# Patient Record
Sex: FEMALE | Race: BLACK OR AFRICAN AMERICAN | NOT HISPANIC OR LATINO | Employment: OTHER | URBAN - METROPOLITAN AREA
[De-identification: names, ages, dates, MRNs, and addresses within clinical notes are randomized per-mention and may not be internally consistent; named-entity substitution may affect disease eponyms.]

---

## 2017-01-30 RX ORDER — AZATHIOPRINE 50 MG/1
TABLET ORAL
Qty: 90 TABLET | Refills: 3 | Status: SHIPPED | OUTPATIENT
Start: 2017-01-30 | End: 2017-04-03 | Stop reason: SDUPTHER

## 2017-02-13 RX ORDER — RABEPRAZOLE SODIUM 20 MG/1
20 TABLET, DELAYED RELEASE ORAL DAILY
Qty: 90 TABLET | Refills: 0 | Status: SHIPPED | OUTPATIENT
Start: 2017-02-13 | End: 2017-05-11 | Stop reason: SDUPTHER

## 2017-04-04 RX ORDER — AZATHIOPRINE 50 MG/1
TABLET ORAL
Qty: 90 TABLET | Refills: 1 | Status: SHIPPED | OUTPATIENT
Start: 2017-04-04 | End: 2017-08-03 | Stop reason: SDUPTHER

## 2017-05-12 RX ORDER — RABEPRAZOLE SODIUM 20 MG/1
20 TABLET, DELAYED RELEASE ORAL DAILY
Qty: 90 TABLET | Refills: 0 | Status: SHIPPED | OUTPATIENT
Start: 2017-05-12 | End: 2017-08-03 | Stop reason: SDUPTHER

## 2017-07-24 RX ORDER — NALOXEGOL OXALATE 25 MG/1
TABLET, FILM COATED ORAL
Qty: 30 TABLET | Refills: 2 | OUTPATIENT
Start: 2017-07-24

## 2017-07-24 RX ORDER — BUDESONIDE 9 MG/1
TABLET, EXTENDED RELEASE ORAL
Qty: 30 TABLET | Refills: 11 | OUTPATIENT
Start: 2017-07-24

## 2017-07-28 RX ORDER — NALOXEGOL OXALATE 25 MG/1
TABLET, FILM COATED ORAL
Qty: 30 TABLET | Refills: 0 | Status: SHIPPED | OUTPATIENT
Start: 2017-07-28 | End: 2017-08-24 | Stop reason: SDUPTHER

## 2017-07-28 RX ORDER — BUDESONIDE 9 MG/1
TABLET, EXTENDED RELEASE ORAL
Qty: 30 TABLET | Refills: 0 | Status: SHIPPED | OUTPATIENT
Start: 2017-07-28 | End: 2017-08-28 | Stop reason: SDUPTHER

## 2017-07-28 NOTE — TELEPHONE ENCOUNTER
Attempted to call patient regarding her medication refills, and yearly f/u no answer, no voicemail. Unable to leave message.

## 2017-08-03 ENCOUNTER — TELEPHONE (OUTPATIENT)
Dept: GASTROENTEROLOGY | Facility: CLINIC | Age: 47
End: 2017-08-03

## 2017-08-03 RX ORDER — AZATHIOPRINE 50 MG/1
150 TABLET ORAL DAILY
Qty: 90 TABLET | Refills: 0 | Status: SHIPPED | OUTPATIENT
Start: 2017-08-03 | End: 2018-09-27 | Stop reason: SDUPTHER

## 2017-08-03 RX ORDER — RABEPRAZOLE SODIUM 20 MG/1
20 TABLET, DELAYED RELEASE ORAL DAILY
Qty: 30 TABLET | Refills: 0 | Status: SHIPPED | OUTPATIENT
Start: 2017-08-03 | End: 2017-08-24 | Stop reason: SDUPTHER

## 2017-08-03 NOTE — TELEPHONE ENCOUNTER
----- Message from Nanci Fischer sent at 8/3/2017  4:04 PM EDT -----  Regarding: PT CALLED - [facility]  Contact: [phone]  ASK IF SHE COULD GET REFILL ON HER MEDS. SEE PENDING REQUEST.

## 2017-08-03 NOTE — TELEPHONE ENCOUNTER
Patient called back she states her RABEprazole and Imuran need to be refilled.  Advised will fill for one month, she must keep her appt with Dr. Hays to continue receiving refills.

## 2017-08-16 RX ORDER — RABEPRAZOLE SODIUM 20 MG/1
TABLET, DELAYED RELEASE ORAL
Qty: 90 TABLET | Refills: 0 | Status: SHIPPED | OUTPATIENT
Start: 2017-08-16 | End: 2017-08-24 | Stop reason: SDUPTHER

## 2017-08-24 ENCOUNTER — OFFICE VISIT (OUTPATIENT)
Dept: GASTROENTEROLOGY | Facility: CLINIC | Age: 47
End: 2017-08-24

## 2017-08-24 VITALS
SYSTOLIC BLOOD PRESSURE: 142 MMHG | DIASTOLIC BLOOD PRESSURE: 82 MMHG | BODY MASS INDEX: 38.99 KG/M2 | HEIGHT: 64 IN | WEIGHT: 228.4 LBS | TEMPERATURE: 98.6 F

## 2017-08-24 DIAGNOSIS — K59.03 CONSTIPATION DUE TO OPIOID THERAPY: Primary | ICD-10-CM

## 2017-08-24 DIAGNOSIS — T40.2X5A CONSTIPATION DUE TO OPIOID THERAPY: Primary | ICD-10-CM

## 2017-08-24 DIAGNOSIS — K51.30 ULCERATIVE RECTOSIGMOIDITIS WITHOUT COMPLICATION (HCC): ICD-10-CM

## 2017-08-24 PROCEDURE — 99213 OFFICE O/P EST LOW 20 MIN: CPT | Performed by: INTERNAL MEDICINE

## 2017-08-24 RX ORDER — FERROUS SULFATE 325(65) MG
TABLET ORAL
COMMUNITY
Start: 2017-08-10

## 2017-08-24 RX ORDER — RABEPRAZOLE SODIUM 20 MG/1
20 TABLET, DELAYED RELEASE ORAL DAILY
Qty: 30 TABLET | Refills: 11 | Status: SHIPPED | OUTPATIENT
Start: 2017-08-24 | End: 2018-07-17 | Stop reason: SDUPTHER

## 2017-08-24 RX ORDER — AZATHIOPRINE 50 MG/1
150 TABLET ORAL DAILY
Qty: 90 TABLET | Refills: 11 | Status: SHIPPED | OUTPATIENT
Start: 2017-08-24 | End: 2018-09-27 | Stop reason: SDUPTHER

## 2017-08-24 NOTE — PROGRESS NOTES
Chief Complaint   Patient presents with   • Follow-up     medication refill        Joe Hernandez is a  47 y.o. female here for a follow up visit for Medicine refill in patient with ulcerative proctosigmoiditis GERD and narcotic-induced constipation.    HPI    Patient 39-year-old female with history of ulcerative proctosigmoiditis and GERD status post back injury requiring chronic narcotic use.  Because of this patient with severe constipation improved only with Movantik.  Patient doing well on her current regimen dose.  Patient denies nausea vomiting no bright red blood per rectum or melena.  Patient here for refills.    Past Medical History:   Diagnosis Date   • History of CT scan of abdomen 08/04/2013    fatty liver, post gastric stapling procedure, wal thickening of colon, colitis   • Osteoarthritis    • Spinal cord injury, cervical region    • Ulcerative colitis          Current Outpatient Prescriptions:   •  amitriptyline (ELAVIL) 25 MG tablet, TAKE 1 TABLET BY MOUTH NIGHTLY, Disp: , Rfl: 3  •  azaTHIOprine (IMURAN) 50 MG tablet, Take 3 tablets by mouth Daily., Disp: 90 tablet, Rfl: 0  •  azaTHIOprine (IMURAN) 50 MG tablet, Take 3 tablets by mouth Daily., Disp: 90 tablet, Rfl: 11  •  Biotin 10 MG tablet, Take  by mouth., Disp: , Rfl:   •  cyclobenzaprine (FLEXERIL) 10 MG tablet, Take  by mouth., Disp: , Rfl:   •  docusate sodium (COLACE) 100 MG capsule, TAKE 1 CAPSULE BY MOUTH 2 (TWO) TIMES DAILY, Disp: , Rfl: 0  •  ferrous sulfate 325 (65 FE) MG tablet, TAKE 1 TABLET EVERY DAY, Disp: , Rfl:   •  furosemide (LASIX) 20 MG tablet, TAKE 1 TABLET BY MOUTH 2 (TWO) TIMES DAILY, Disp: , Rfl: 3  •  gabapentin (NEURONTIN) 800 MG tablet, Take  by mouth., Disp: , Rfl:   •  HYDROcodone-acetaminophen (NORCO)  MG per tablet, TAKE 1 TABLET BY MOUTH 4 TIMES A DAY AS NEEDED FOR PAIN, Disp: , Rfl: 0  •  loratadine (CLARITIN) 10 MG tablet, TAKE 1 TABLET BY MOUTH DAILY AS NEEDED FOR ALLERGIES, Disp: , Rfl: 3  •   Multiple Vitamins-Minerals (MULTI COMPLETE PO), Take  by mouth., Disp: , Rfl:   •  Naloxegol Oxalate (MOVANTIK) 25 MG tablet, Take 25 mg by mouth Daily., Disp: 30 tablet, Rfl: 11  •  OXYCONTIN 40 MG 12 hr tablet, TAKE 1 TABLET BY MOUTH EVERY 12 HOURS AS NEEDED FOR PAIN, Disp: , Rfl: 0  •  Probiotic capsule, Take  by mouth., Disp: , Rfl:   •  RABEprazole (ACIPHEX) 20 MG EC tablet, Take 1 tablet by mouth Daily., Disp: 30 tablet, Rfl: 11  •  spironolactone (ALDACTONE) 50 MG tablet, TAKE 1 TABLET BY MOUTH DAILY, Disp: , Rfl: 3  •  UCERIS 9 MG tablet sustained-release 24 hour, TAKE 1 TABLET EVERY DAY, Disp: 30 tablet, Rfl: 0  •  vitamin D (ERGOCALCIFEROL) 62624 UNITS capsule capsule, TAKE 1 CAPSULE BY MOUTH ONCE A WEEK FOR 6 DOSES, Disp: , Rfl: 0    Allergies   Allergen Reactions   • Penicillins        Social History     Social History   • Marital status: Single     Spouse name: N/A   • Number of children: N/A   • Years of education: N/A     Occupational History   • Not on file.     Social History Main Topics   • Smoking status: Never Smoker   • Smokeless tobacco: Never Used   • Alcohol use No   • Drug use: No   • Sexual activity: Not on file     Other Topics Concern   • Not on file     Social History Narrative       History reviewed. No pertinent family history.    Review of Systems   Constitutional: Negative.    Respiratory: Negative.    Cardiovascular: Negative.    Gastrointestinal: Positive for constipation. Negative for abdominal distention, abdominal pain, nausea and vomiting.   Musculoskeletal: Positive for back pain and gait problem.   Skin: Negative.    Hematological: Negative.        Vitals:    08/24/17 1542   BP: 142/82   Temp: 98.6 °F (37 °C)       Physical Exam   Constitutional: She is oriented to person, place, and time. She appears well-developed and well-nourished.   HENT:   Head: Normocephalic and atraumatic.   Eyes: Conjunctivae are normal. No scleral icterus.   Cardiovascular: Exam reveals no gallop  and no friction rub.    No murmur heard.  Pulmonary/Chest: She has no wheezes. She has no rales.   Abdominal: Soft. Bowel sounds are normal. She exhibits no distension and no mass. There is no tenderness. No hernia.   Neurological: She is alert and oriented to person, place, and time.   Skin: Skin is warm and dry.   Psychiatric: She has a normal mood and affect. Her behavior is normal. Thought content normal.   Vitals reviewed.      No visits with results within 2 Month(s) from this visit.  Latest known visit with results is:    Hospital Outpatient Visit on 10/01/2014   Component Date Value Ref Range Status   • WBC 09/30/2014 7.00  4.50 - 10.70 K/Cumm Final   • RBC 09/30/2014 3.73* 3.90 - 5.20 Million Final   • Hemoglobin 09/30/2014 10.3* 11.9 - 15.5 g/dL Final   • Hematocrit 09/30/2014 32.8* 35.6 - 45.5 % Final   • MCV 09/30/2014 87.9  80.5 - 98.2 fL Final   • MCH 09/30/2014 27.6  26.9 - 32.0 pg Final   • MCHC 09/30/2014 31.4* 32.4 - 36.3 g/dL Final   • RDW 09/30/2014 13.4* 11.7 - 13.0 % Final   • Platelets 09/30/2014 420  140 - 500 K/Cumm Final   • Glucose 09/30/2014 87  65 - 99 mg/dL Final   • BUN 09/30/2014 11  6 - 20 mg/dL Final   • Creatinine 09/30/2014 0.53* 0.57 - 1.00 mg/dL Final   • Sodium 09/30/2014 140  136 - 145 mmol/L Final   • Potassium 09/30/2014 4.2  3.5 - 5.2 mmol/L Final   • Chloride 09/30/2014 104  98 - 107 mmol/L Final   • CO2 09/30/2014 26  22 - 29 mmol/L Final   • Calcium 09/30/2014 8.8  8.6 - 10.5 mg/dL Final   • eGFR 09/30/2014 >60  ml/min/1.732 Final   • HCG Qualitative 09/30/2014 Negative   Final       Joe was seen today for follow-up.    Diagnoses and all orders for this visit:    Constipation due to opioid therapy    Ulcerative rectosigmoiditis without complication    Other orders  -     azaTHIOprine (IMURAN) 50 MG tablet; Take 3 tablets by mouth Daily.  -     Naloxegol Oxalate (MOVANTIK) 25 MG tablet; Take 25 mg by mouth Daily.  -     RABEprazole (ACIPHEX) 20 MG EC tablet; Take 1  tablet by mouth Daily.      Patient 39-year-old female with history of GERD ulcerative proctosigmoiditis and narcotic-induced constipation doing well on current regimen.  Patient taking azathioprine and Uceris for her ulcerative proctosigmoiditis AcipHex for her GERD and Movantik for the constipation all doing a good job.  We'll continue current medication and plan follow-up in 1 year for further recommendations.

## 2017-08-28 RX ORDER — BUDESONIDE 9 MG/1
TABLET, EXTENDED RELEASE ORAL
Qty: 90 TABLET | Refills: 3 | Status: SHIPPED | OUTPATIENT
Start: 2017-08-28 | End: 2018-07-17 | Stop reason: SDUPTHER

## 2017-09-07 RX ORDER — DOCUSATE SODIUM 100 MG/1
CAPSULE, LIQUID FILLED ORAL
Qty: 60 CAPSULE | Refills: 10 | Status: SHIPPED | OUTPATIENT
Start: 2017-09-07 | End: 2018-11-02 | Stop reason: SDUPTHER

## 2017-10-03 RX ORDER — AZATHIOPRINE 50 MG/1
150 TABLET ORAL DAILY
Qty: 270 TABLET | Refills: 2 | Status: SHIPPED | OUTPATIENT
Start: 2017-10-03 | End: 2018-06-24 | Stop reason: SDUPTHER

## 2017-10-03 NOTE — TELEPHONE ENCOUNTER
Request received from [facility] pharmacy for a 90 day supply on pt's azathioprine. Medication e-scribed.

## 2017-10-13 RX ORDER — NALOXEGOL OXALATE 25 MG/1
TABLET, FILM COATED ORAL
Qty: 30 TABLET | Refills: 8 | Status: SHIPPED | OUTPATIENT
Start: 2017-10-13 | End: 2018-09-27 | Stop reason: SDUPTHER

## 2018-07-02 RX ORDER — AZATHIOPRINE 50 MG/1
150 TABLET ORAL DAILY
Qty: 90 TABLET | Refills: 0 | Status: SHIPPED | OUTPATIENT
Start: 2018-07-02 | End: 2018-09-27 | Stop reason: SDUPTHER

## 2018-07-25 RX ORDER — RABEPRAZOLE SODIUM 20 MG/1
20 TABLET, DELAYED RELEASE ORAL DAILY
Qty: 30 TABLET | Refills: 0 | Status: SHIPPED | OUTPATIENT
Start: 2018-07-25 | End: 2018-08-27 | Stop reason: SDUPTHER

## 2018-07-30 RX ORDER — AZATHIOPRINE 50 MG/1
150 TABLET ORAL DAILY
Qty: 90 TABLET | Refills: 0 | OUTPATIENT
Start: 2018-07-30

## 2018-08-03 RX ORDER — AZATHIOPRINE 50 MG/1
150 TABLET ORAL DAILY
Qty: 90 TABLET | Refills: 0 | OUTPATIENT
Start: 2018-08-03

## 2018-08-15 RX ORDER — AZATHIOPRINE 50 MG/1
150 TABLET ORAL DAILY
Qty: 90 TABLET | Refills: 0 | OUTPATIENT
Start: 2018-08-15

## 2018-08-17 RX ORDER — BUDESONIDE 9 MG/1
TABLET, EXTENDED RELEASE ORAL
Qty: 90 TABLET | Refills: 3 | OUTPATIENT
Start: 2018-08-17

## 2018-08-17 RX ORDER — AZATHIOPRINE 50 MG/1
150 TABLET ORAL DAILY
Qty: 270 TABLET | Refills: 2 | OUTPATIENT
Start: 2018-08-17

## 2018-08-20 RX ORDER — AZATHIOPRINE 50 MG/1
150 TABLET ORAL DAILY
Qty: 90 TABLET | Refills: 0 | OUTPATIENT
Start: 2018-08-20

## 2018-08-21 ENCOUNTER — TELEPHONE (OUTPATIENT)
Dept: GASTROENTEROLOGY | Facility: CLINIC | Age: 48
End: 2018-08-21

## 2018-08-21 RX ORDER — AZATHIOPRINE 50 MG/1
150 TABLET ORAL DAILY
Qty: 90 TABLET | Refills: 0 | Status: SHIPPED | OUTPATIENT
Start: 2018-08-21 | End: 2018-09-27 | Stop reason: SDUPTHER

## 2018-08-21 RX ORDER — BUDESONIDE 9 MG/1
9 TABLET, FILM COATED, EXTENDED RELEASE ORAL DAILY
Qty: 30 TABLET | Refills: 0 | Status: SHIPPED | OUTPATIENT
Start: 2018-08-21 | End: 2018-09-27 | Stop reason: SDUPTHER

## 2018-08-21 NOTE — TELEPHONE ENCOUNTER
----- Message from Jaylen Alcocer sent at 8/21/2018  3:01 PM EDT -----  Regarding: PENDING MED   Contact: [phone]  PT CALLED WAITING ON MED

## 2018-08-23 RX ORDER — AZATHIOPRINE 50 MG/1
150 TABLET ORAL DAILY
Qty: 90 TABLET | Refills: 0 | OUTPATIENT
Start: 2018-08-23

## 2018-08-28 RX ORDER — RABEPRAZOLE SODIUM 20 MG/1
20 TABLET, DELAYED RELEASE ORAL DAILY
Qty: 30 TABLET | Refills: 0 | Status: SHIPPED | OUTPATIENT
Start: 2018-08-28 | End: 2018-08-31 | Stop reason: SDUPTHER

## 2018-08-31 ENCOUNTER — TELEPHONE (OUTPATIENT)
Dept: GASTROENTEROLOGY | Facility: CLINIC | Age: 48
End: 2018-08-31

## 2018-08-31 RX ORDER — RABEPRAZOLE SODIUM 20 MG/1
20 TABLET, DELAYED RELEASE ORAL DAILY
Qty: 30 TABLET | Refills: 0 | Status: SHIPPED | OUTPATIENT
Start: 2018-08-31 | End: 2018-09-27 | Stop reason: SDUPTHER

## 2018-09-21 RX ORDER — RABEPRAZOLE SODIUM 20 MG/1
TABLET, DELAYED RELEASE ORAL
Qty: 30 TABLET | Refills: 0 | OUTPATIENT
Start: 2018-09-21

## 2018-09-21 RX ORDER — BUDESONIDE 9 MG/1
TABLET, FILM COATED, EXTENDED RELEASE ORAL
Qty: 30 TABLET | Refills: 0 | OUTPATIENT
Start: 2018-09-21

## 2018-09-27 ENCOUNTER — OFFICE VISIT (OUTPATIENT)
Dept: GASTROENTEROLOGY | Facility: CLINIC | Age: 48
End: 2018-09-27

## 2018-09-27 VITALS
DIASTOLIC BLOOD PRESSURE: 82 MMHG | TEMPERATURE: 97.9 F | BODY MASS INDEX: 39.09 KG/M2 | SYSTOLIC BLOOD PRESSURE: 122 MMHG | WEIGHT: 229 LBS | HEIGHT: 64 IN

## 2018-09-27 DIAGNOSIS — K51.30 ULCERATIVE RECTOSIGMOIDITIS WITHOUT COMPLICATION (HCC): ICD-10-CM

## 2018-09-27 DIAGNOSIS — K59.03 CONSTIPATION DUE TO OPIOID THERAPY: Primary | ICD-10-CM

## 2018-09-27 DIAGNOSIS — T40.2X5A CONSTIPATION DUE TO OPIOID THERAPY: Primary | ICD-10-CM

## 2018-09-27 PROCEDURE — 99213 OFFICE O/P EST LOW 20 MIN: CPT | Performed by: INTERNAL MEDICINE

## 2018-09-27 RX ORDER — AZATHIOPRINE 50 MG/1
150 TABLET ORAL DAILY
Qty: 90 TABLET | Refills: 11 | Status: SHIPPED | OUTPATIENT
Start: 2018-09-27 | End: 2019-10-15

## 2018-09-27 NOTE — PROGRESS NOTES
Chief Complaint   Patient presents with   • Annual Exam     Constipation       Joe Hernandez is a  48 y.o. female here for a follow up visit for ulcerative proctosigmoiditis and chronic constipation due to narcotics.    HPI    Patient 48-year-old female with history of ulcerative proctosigmoiditis, osteoarthritis, cervical spine injury and chronic constipation here for follow-up.  Patient reports still constipated though improved having bowel movement every 1-2 weeks.  Patient reports symptomatically much better than before she was taking Movantik.  Since his more regular but still only every other week or so.  Patient also on Colace for this as well.  Patient denies fever chills no chest pain or palpitations.    Past Medical History:   Diagnosis Date   • History of CT scan of abdomen 08/04/2013    fatty liver, post gastric stapling procedure, wal thickening of colon, colitis   • Osteoarthritis    • Spinal cord injury, cervical region (CMS/HCC)    • Ulcerative colitis (CMS/HCC)          Current Outpatient Prescriptions:   •  azaTHIOprine (IMURAN) 50 MG tablet, Take 3 tablets by mouth Daily., Disp: 90 tablet, Rfl: 11  •  Biotin 10 MG tablet, Take  by mouth., Disp: , Rfl:   •  Budesonide ER (UCERIS) 9 MG tablet sustained-release 24 hour, Take 9 mg by mouth Daily., Disp: 30 tablet, Rfl: 0  •  cyclobenzaprine (FLEXERIL) 10 MG tablet, Take  by mouth., Disp: , Rfl:   •  docusate sodium (COLACE) 100 MG capsule, TAKE 1 CAPSULE BY MOUTH 2 (TWO) TIMES DAILY, Disp: 60 capsule, Rfl: 10  •  ferrous sulfate 325 (65 FE) MG tablet, TAKE 1 TABLET EVERY DAY, Disp: , Rfl:   •  gabapentin (NEURONTIN) 800 MG tablet, Take  by mouth., Disp: , Rfl:   •  loratadine (CLARITIN) 10 MG tablet, TAKE 1 TABLET BY MOUTH DAILY AS NEEDED FOR ALLERGIES, Disp: , Rfl: 3  •  Multiple Vitamins-Minerals (MULTI COMPLETE PO), Take  by mouth., Disp: , Rfl:   •  Naloxegol Oxalate (MOVANTIK) 25 MG tablet, Take 1 tablet by mouth Daily., Disp: 30 tablet,  Rfl: 11  •  OXYCONTIN 40 MG 12 hr tablet, TAKE 1 TABLET BY MOUTH EVERY 12 HOURS AS NEEDED FOR PAIN, Disp: , Rfl: 0  •  Probiotic capsule, Take  by mouth., Disp: , Rfl:   •  RABEprazole (ACIPHEX) 20 MG EC tablet, Take 1 tablet by mouth Daily., Disp: 30 tablet, Rfl: 0  •  linaclotide (LINZESS) 290 MCG capsule capsule, Take 1 capsule by mouth Every Morning Before Breakfast., Disp: 90 capsule, Rfl: 3    Allergies   Allergen Reactions   • Red Dye Rash and Swelling   • Adhesive Tape Rash   • Penicillins Rash       Social History     Social History   • Marital status: Single     Spouse name: N/A   • Number of children: N/A   • Years of education: N/A     Occupational History   • Not on file.     Social History Main Topics   • Smoking status: Never Smoker   • Smokeless tobacco: Never Used   • Alcohol use No   • Drug use: No   • Sexual activity: Not on file     Other Topics Concern   • Not on file     Social History Narrative   • No narrative on file       Family History   Problem Relation Age of Onset   • Family history unknown: Yes       Review of Systems   Constitutional: Negative.    Respiratory: Negative.    Cardiovascular: Negative.    Gastrointestinal: Negative.    Musculoskeletal: Positive for arthralgias, back pain, gait problem, neck pain and neck stiffness. Negative for joint swelling and myalgias.   Skin: Negative.    Hematological: Negative.        Vitals:    09/27/18 1356   BP: 122/82   Temp: 97.9 °F (36.6 °C)       Physical Exam   Constitutional: She is oriented to person, place, and time. She appears well-developed and well-nourished.   HENT:   Head: Normocephalic and atraumatic.   Eyes: Pupils are equal, round, and reactive to light. No scleral icterus.   Cardiovascular: Normal rate.  Exam reveals no gallop and no friction rub.    No murmur heard.  Pulmonary/Chest: Effort normal. She has no wheezes. She has no rales.   Abdominal: Soft. Bowel sounds are normal. She exhibits no distension and no mass. There is  no tenderness. No hernia.   Neurological: She is alert and oriented to person, place, and time.   Skin: Skin is warm and dry.   Psychiatric: She has a normal mood and affect. Her behavior is normal. Judgment and thought content normal.   Vitals reviewed.      No visits with results within 2 Month(s) from this visit.   Latest known visit with results is:   Hospital Outpatient Visit on 10/01/2014   Component Date Value Ref Range Status   • WBC 09/30/2014 7.00  4.50 - 10.70 K/Cumm Final   • RBC 09/30/2014 3.73* 3.90 - 5.20 Million Final   • Hemoglobin 09/30/2014 10.3* 11.9 - 15.5 g/dL Final   • Hematocrit 09/30/2014 32.8* 35.6 - 45.5 % Final   • MCV 09/30/2014 87.9  80.5 - 98.2 fL Final   • MCH 09/30/2014 27.6  26.9 - 32.0 pg Final   • MCHC 09/30/2014 31.4* 32.4 - 36.3 g/dL Final   • RDW 09/30/2014 13.4* 11.7 - 13.0 % Final   • Platelets 09/30/2014 420  140 - 500 K/Cumm Final   • Glucose 09/30/2014 87  65 - 99 mg/dL Final   • BUN 09/30/2014 11  6 - 20 mg/dL Final   • Creatinine 09/30/2014 0.53* 0.57 - 1.00 mg/dL Final   • Sodium 09/30/2014 140  136 - 145 mmol/L Final   • Potassium 09/30/2014 4.2  3.5 - 5.2 mmol/L Final   • Chloride 09/30/2014 104  98 - 107 mmol/L Final   • CO2 09/30/2014 26  22 - 29 mmol/L Final   • Calcium 09/30/2014 8.8  8.6 - 10.5 mg/dL Final   • eGFR 09/30/2014 >60  ml/min/1.732 Final   • HCG Qualitative 09/30/2014 Negative   Final       Joe was seen today for annual exam.    Diagnoses and all orders for this visit:    Constipation due to opioid therapy    Ulcerative rectosigmoiditis without complication (CMS/HCC)    Other orders  -     linaclotide (LINZESS) 290 MCG capsule capsule; Take 1 capsule by mouth Every Morning Before Breakfast.  -     azaTHIOprine (IMURAN) 50 MG tablet; Take 3 tablets by mouth Daily.  -     Naloxegol Oxalate (MOVANTIK) 25 MG tablet; Take 1 tablet by mouth Daily.      Patient 48-year-old female with history of proctosigmoiditis related to ulcerative colitis as well as  chronic constipation due to narcotic use.  Patient bowels with no blood per rectum or melena but is having ongoing constipation with only one bowel movement every one to 2 weeks.  Patient definitely better on Movantik but not complete relief.  Patient is also on daily Colace.  This point will add Linzess 290 µg daily and follow clinical response.  Can adjust dosing depending on response.

## 2018-10-03 RX ORDER — RABEPRAZOLE SODIUM 20 MG/1
TABLET, DELAYED RELEASE ORAL
Qty: 90 TABLET | Refills: 2 | Status: SHIPPED | OUTPATIENT
Start: 2018-10-03 | End: 2019-07-02 | Stop reason: SDUPTHER

## 2018-10-04 ENCOUNTER — PRIOR AUTHORIZATION (OUTPATIENT)
Dept: GASTROENTEROLOGY | Facility: CLINIC | Age: 48
End: 2018-10-04

## 2018-10-04 RX ORDER — BUDESONIDE 9 MG/1
TABLET, FILM COATED, EXTENDED RELEASE ORAL
Qty: 90 TABLET | Refills: 2 | Status: SHIPPED | OUTPATIENT
Start: 2018-10-04 | End: 2020-11-10

## 2018-10-10 NOTE — TELEPHONE ENCOUNTER
Letter seems to ignore that pt has ulcerative proctosigmoiditis, reports denial is for use in diagnosis of narcotic induced constipation which she also has, need to correct the diagnosis and resubmit

## 2018-10-11 ENCOUNTER — TELEPHONE (OUTPATIENT)
Dept: GASTROENTEROLOGY | Facility: CLINIC | Age: 48
End: 2018-10-11

## 2018-10-11 NOTE — TELEPHONE ENCOUNTER
----- Message from Jaylen Alcocer sent at 10/11/2018 10:31 AM EDT -----  Regarding: rx  Contact: [phone]  Pending med

## 2018-10-15 NOTE — TELEPHONE ENCOUNTER
"Because there is a denial within the last 60 days for this medication the insurance will not let me resubmit with new DX.    \"There is an EOC that was clinically denied within the last 60 days. This request needs to be sent to the Grievance and Appeals Dept. at [facility]. Appeal requests must come from the member or the provider.\"  "

## 2018-10-16 RX ORDER — RABEPRAZOLE SODIUM 20 MG/1
TABLET, DELAYED RELEASE ORAL
Qty: 30 TABLET | Refills: 0 | OUTPATIENT
Start: 2018-10-16

## 2018-10-23 NOTE — TELEPHONE ENCOUNTER
Letter completed and mailed to:  [employer].   Grievance and Appeals Department   [address] [address]  [place] [address]

## 2018-11-05 RX ORDER — DOCUSATE SODIUM 100 MG/1
CAPSULE, LIQUID FILLED ORAL
Qty: 60 CAPSULE | Refills: 7 | Status: SHIPPED | OUTPATIENT
Start: 2018-11-05 | End: 2019-07-11 | Stop reason: SDUPTHER

## 2018-11-27 RX ORDER — BUDESONIDE 9 MG/1
1 TABLET, FILM COATED, EXTENDED RELEASE ORAL DAILY
Qty: 30 TABLET | Refills: 5 | Status: SHIPPED | OUTPATIENT
Start: 2018-11-27 | End: 2019-06-13 | Stop reason: SDUPTHER

## 2018-12-26 RX ORDER — AZATHIOPRINE 50 MG/1
150 TABLET ORAL DAILY
Qty: 270 TABLET | Refills: 2 | Status: SHIPPED | OUTPATIENT
Start: 2018-12-26 | End: 2019-10-15

## 2019-06-18 RX ORDER — BUDESONIDE 9 MG/1
1 TABLET, FILM COATED, EXTENDED RELEASE ORAL DAILY
Qty: 30 TABLET | Refills: 2 | Status: SHIPPED | OUTPATIENT
Start: 2019-06-18 | End: 2019-10-14 | Stop reason: SDUPTHER

## 2019-07-02 RX ORDER — RABEPRAZOLE SODIUM 20 MG/1
20 TABLET, DELAYED RELEASE ORAL DAILY
Qty: 90 TABLET | Refills: 0 | Status: SHIPPED | OUTPATIENT
Start: 2019-07-02 | End: 2019-10-06 | Stop reason: SDUPTHER

## 2019-07-18 RX ORDER — DOCUSATE SODIUM 100 MG/1
100 CAPSULE, LIQUID FILLED ORAL 2 TIMES DAILY
Qty: 60 CAPSULE | Refills: 0 | Status: SHIPPED | OUTPATIENT
Start: 2019-07-18 | End: 2019-10-15 | Stop reason: SDUPTHER

## 2019-07-18 RX ORDER — AZATHIOPRINE 50 MG/1
150 TABLET ORAL DAILY
Qty: 90 TABLET | Refills: 0 | Status: SHIPPED | OUTPATIENT
Start: 2019-07-18 | End: 2019-10-15 | Stop reason: SDUPTHER

## 2019-09-17 RX ORDER — BUDESONIDE 9 MG/1
1 TABLET, FILM COATED, EXTENDED RELEASE ORAL DAILY
Qty: 30 TABLET | Refills: 2 | OUTPATIENT
Start: 2019-09-17

## 2019-10-08 RX ORDER — RABEPRAZOLE SODIUM 20 MG/1
20 TABLET, DELAYED RELEASE ORAL DAILY
Qty: 30 TABLET | Refills: 0 | Status: SHIPPED | OUTPATIENT
Start: 2019-10-08 | End: 2019-10-15 | Stop reason: SDUPTHER

## 2019-10-15 ENCOUNTER — OFFICE VISIT (OUTPATIENT)
Dept: GASTROENTEROLOGY | Facility: CLINIC | Age: 49
End: 2019-10-15

## 2019-10-15 VITALS — WEIGHT: 241.2 LBS | BODY MASS INDEX: 41.18 KG/M2 | HEIGHT: 64 IN | TEMPERATURE: 99 F

## 2019-10-15 DIAGNOSIS — K59.03 CONSTIPATION DUE TO OPIOID THERAPY: ICD-10-CM

## 2019-10-15 DIAGNOSIS — T40.2X5A CONSTIPATION DUE TO OPIOID THERAPY: ICD-10-CM

## 2019-10-15 DIAGNOSIS — K51.30 ULCERATIVE RECTOSIGMOIDITIS WITHOUT COMPLICATION (HCC): Primary | ICD-10-CM

## 2019-10-15 PROCEDURE — 99212 OFFICE O/P EST SF 10 MIN: CPT | Performed by: INTERNAL MEDICINE

## 2019-10-15 RX ORDER — DOCUSATE SODIUM 100 MG/1
100 CAPSULE, LIQUID FILLED ORAL 2 TIMES DAILY
Qty: 60 CAPSULE | Refills: 11 | Status: SHIPPED | OUTPATIENT
Start: 2019-10-15

## 2019-10-15 RX ORDER — MEDROXYPROGESTERONE ACETATE 10 MG/1
TABLET ORAL
COMMUNITY
Start: 2019-10-14

## 2019-10-15 RX ORDER — AZATHIOPRINE 50 MG/1
150 TABLET ORAL DAILY
Qty: 90 TABLET | Refills: 11 | Status: SHIPPED | OUTPATIENT
Start: 2019-10-15 | End: 2020-11-10 | Stop reason: SDUPTHER

## 2019-10-15 RX ORDER — RABEPRAZOLE SODIUM 20 MG/1
20 TABLET, DELAYED RELEASE ORAL DAILY
Qty: 30 TABLET | Refills: 11 | Status: SHIPPED | OUTPATIENT
Start: 2019-10-15 | End: 2020-10-27

## 2019-10-15 NOTE — PROGRESS NOTES
Chief Complaint   Patient presents with   • Follow-up   • Constipation   • Med Refill       Joe Hernandez is a  49 y.o. female here for a follow up visit for chronic constipation ulcerative proctitis.    HPI     Patient 49-year-old female with history of spinal cord injury with chronic back pain and ulcerative proctosigmoiditis and chronic constipation due to narcotics here for follow-up.  Patient reports due to cost of medication has been missing most of her medicines.  Patient taking azathioprine daily as well as Colace and Movantik.  Patient also taking AcipHex daily.  Patient here for med refill.    Past Medical History:   Diagnosis Date   • History of CT scan of abdomen 08/04/2013    fatty liver, post gastric stapling procedure, wal thickening of colon, colitis   • Osteoarthritis    • Spinal cord injury, cervical region (CMS/HCC)    • Ulcerative colitis (CMS/HCC)          Current Outpatient Medications:   •  azaTHIOprine (IMURAN) 50 MG tablet, Take 3 tablets by mouth Daily., Disp: 90 tablet, Rfl: 11  •  Budesonide ER 9 MG tablet sustained-release 24 hour, TAKE 1 TABLET BY MOUTH EVERY DAY, Disp: 90 tablet, Rfl: 2  •  Budesonide ER 9 MG tablet sustained-release 24 hour, Take 1 tablet by mouth Daily. ** Please make an appointment for annual visit. Thank you., Disp: 30 tablet, Rfl: 2  •  cyclobenzaprine (FLEXERIL) 10 MG tablet, Take  by mouth., Disp: , Rfl:   •  docusate sodium (COLACE) 100 MG capsule, Take 1 capsule by mouth 2 (Two) Times a Day., Disp: 60 capsule, Rfl: 11  •  ferrous sulfate 325 (65 FE) MG tablet, TAKE 1 TABLET EVERY DAY, Disp: , Rfl:   •  gabapentin (NEURONTIN) 800 MG tablet, Take  by mouth., Disp: , Rfl:   •  loratadine (CLARITIN) 10 MG tablet, TAKE 1 TABLET BY MOUTH DAILY AS NEEDED FOR ALLERGIES, Disp: , Rfl: 3  •  medroxyPROGESTERone (PROVERA) 10 MG tablet, , Disp: , Rfl:   •  Multiple Vitamins-Minerals (MULTI COMPLETE PO), Take  by mouth., Disp: , Rfl:   •  Naloxegol Oxalate  (MOVANTIK) 25 MG tablet, Take 1 tablet by mouth Daily., Disp: 30 tablet, Rfl: 11  •  OXYCONTIN 40 MG 12 hr tablet, TAKE 1 TABLET BY MOUTH EVERY 12 HOURS AS NEEDED FOR PAIN, Disp: , Rfl: 0  •  Probiotic capsule, Take  by mouth., Disp: , Rfl:   •  RABEprazole (ACIPHEX) 20 MG EC tablet, Take 1 tablet by mouth Daily., Disp: 30 tablet, Rfl: 11  •  Biotin 10 MG tablet, Take  by mouth., Disp: , Rfl:     Allergies   Allergen Reactions   • Red Dye Rash and Swelling   • Adhesive Tape Rash   • Penicillins Rash       Social History     Socioeconomic History   • Marital status: Single     Spouse name: Not on file   • Number of children: Not on file   • Years of education: Not on file   • Highest education level: Not on file   Tobacco Use   • Smoking status: Never Smoker   • Smokeless tobacco: Never Used   Substance and Sexual Activity   • Alcohol use: No   • Drug use: No       Family History   Family history unknown: Yes       Review of Systems   Constitutional: Negative.    Respiratory: Negative.    Cardiovascular: Negative.    Gastrointestinal: Positive for abdominal pain and constipation. Negative for abdominal distention, anal bleeding, blood in stool, diarrhea, nausea, rectal pain and vomiting.   Musculoskeletal: Negative.    Skin: Negative.    Hematological: Negative.        Vitals:    10/15/19 1413   Temp: 99 °F (37.2 °C)       Physical Exam   Constitutional: She is oriented to person, place, and time. She appears well-developed and well-nourished.   HENT:   Head: Normocephalic and atraumatic.   Cardiovascular: Normal rate, regular rhythm and normal heart sounds.   Pulmonary/Chest: Effort normal and breath sounds normal.   Abdominal: Soft. Bowel sounds are normal. She exhibits no distension and no mass. There is no tenderness. No hernia.   Musculoskeletal: She exhibits edema.   Positive lower extremity edema right greater than left   Neurological: She is alert and oriented to person, place, and time.   Skin: Skin is warm  and dry. No rash noted.   Psychiatric: She has a normal mood and affect. Her behavior is normal.   Vitals reviewed.      No visits with results within 2 Month(s) from this visit.   Latest known visit with results is:   Hospital Outpatient Visit on 10/01/2014   Component Date Value Ref Range Status   • WBC 09/30/2014 7.00  4.50 - 10.70 K/Cumm Final   • RBC 09/30/2014 3.73* 3.90 - 5.20 Million Final   • Hemoglobin 09/30/2014 10.3* 11.9 - 15.5 g/dL Final   • Hematocrit 09/30/2014 32.8* 35.6 - 45.5 % Final   • MCV 09/30/2014 87.9  80.5 - 98.2 fL Final   • MCH 09/30/2014 27.6  26.9 - 32.0 pg Final   • MCHC 09/30/2014 31.4* 32.4 - 36.3 g/dL Final   • RDW 09/30/2014 13.4* 11.7 - 13.0 % Final   • Platelets 09/30/2014 420  140 - 500 K/Cumm Final   • Glucose 09/30/2014 87  65 - 99 mg/dL Final   • BUN 09/30/2014 11  6 - 20 mg/dL Final   • Creatinine 09/30/2014 0.53* 0.57 - 1.00 mg/dL Final   • Sodium 09/30/2014 140  136 - 145 mmol/L Final   • Potassium 09/30/2014 4.2  3.5 - 5.2 mmol/L Final   • Chloride 09/30/2014 104  98 - 107 mmol/L Final   • CO2 09/30/2014 26  22 - 29 mmol/L Final   • Calcium 09/30/2014 8.8  8.6 - 10.5 mg/dL Final   • eGFR 09/30/2014 >60  ml/min/1.732 Final   • HCG Qualitative 09/30/2014 Negative   Final       Joe was seen today for follow-up, constipation and med refill.    Diagnoses and all orders for this visit:    Ulcerative rectosigmoiditis without complication (CMS/HCC)    Constipation due to opioid therapy    Other orders  -     azaTHIOprine (IMURAN) 50 MG tablet; Take 3 tablets by mouth Daily.  -     docusate sodium (COLACE) 100 MG capsule; Take 1 capsule by mouth 2 (Two) Times a Day.  -     RABEprazole (ACIPHEX) 20 MG EC tablet; Take 1 tablet by mouth Daily.  -     Naloxegol Oxalate (MOVANTIK) 25 MG tablet; Take 1 tablet by mouth Daily.      Patient 49-year-old female with history of chronic back pain wheelchair-bound with lymphedema in the lower extremities and ulcerative colitis here for  follow-up.  Patient doing fairly well on current regimen though was unable to afford both her Movantik and her Linzess so has been using the Movantik only.  Patient was the bathroom approximately once weekly.  Patient denies any nausea vomiting no melena noted.  Will continue current therapy and give Linzess samples to aid for the days in between when she needs to go to the bathroom.

## 2019-10-16 RX ORDER — NALOXEGOL OXALATE 25 MG/1
TABLET, FILM COATED ORAL
Qty: 30 TABLET | Refills: 11 | OUTPATIENT
Start: 2019-10-16

## 2019-10-17 RX ORDER — BUDESONIDE 9 MG/1
1 TABLET, FILM COATED, EXTENDED RELEASE ORAL DAILY
Qty: 30 TABLET | Refills: 2 | Status: SHIPPED | OUTPATIENT
Start: 2019-10-17 | End: 2020-11-10

## 2019-11-01 ENCOUNTER — PRIOR AUTHORIZATION (OUTPATIENT)
Dept: GASTROENTEROLOGY | Facility: CLINIC | Age: 49
End: 2019-11-01

## 2019-11-04 RX ORDER — BUDESONIDE 9 MG/1
1 TABLET, FILM COATED, EXTENDED RELEASE ORAL DAILY
Qty: 30 TABLET | Refills: 10 | Status: SHIPPED | OUTPATIENT
Start: 2019-11-04 | End: 2020-11-10

## 2019-11-04 NOTE — TELEPHONE ENCOUNTER
Med e-scribed   
Okay to call in and refill x11  
PA has been approved as a 30 day supply.  Insurance will not approve a 90 day supply for patient.  Letter is in media   
PA submitted through [facility] for Budesonide ER 9MG er tablets.  Pending   
awaiting bed, no change
awaiting transfer, no change

## 2019-12-11 RX ORDER — AZATHIOPRINE 50 MG/1
150 TABLET ORAL DAILY
Qty: 270 TABLET | Refills: 2 | Status: SHIPPED | OUTPATIENT
Start: 2019-12-11 | End: 2020-09-14

## 2020-01-21 ENCOUNTER — TELEPHONE (OUTPATIENT)
Dept: GASTROENTEROLOGY | Facility: CLINIC | Age: 50
End: 2020-01-21

## 2020-01-21 NOTE — TELEPHONE ENCOUNTER
----- Message from Ismael Day sent at 1/20/2020  4:14 PM EST -----  Regarding: meds  Contact: [phone]  Pt is calling wanting to talk to the nurse about her medication and a letter she received.

## 2020-03-03 ENCOUNTER — PRIOR AUTHORIZATION (OUTPATIENT)
Dept: GASTROENTEROLOGY | Facility: CLINIC | Age: 50
End: 2020-03-03

## 2020-09-14 ENCOUNTER — TELEPHONE (OUTPATIENT)
Dept: GASTROENTEROLOGY | Facility: CLINIC | Age: 50
End: 2020-09-14

## 2020-09-14 RX ORDER — AZATHIOPRINE 50 MG/1
TABLET ORAL
Qty: 270 TABLET | Refills: 0 | Status: SHIPPED | OUTPATIENT
Start: 2020-09-14 | End: 2020-11-10 | Stop reason: SDUPTHER

## 2020-09-14 NOTE — TELEPHONE ENCOUNTER
----- Message from Ismael Arora sent at 9/11/2020  3:49 PM EDT -----  Regarding: REFILL REQUEST BY PHARMACY-NO RESPONSE        PT STATED PHARMACY HAS SENT ORDER MEDICINE ORDER 3 TIMES, NO RESPONSE. [phone]    azaTHIOprine (IMURAN) 50 MG tablet 90 tablet 11 10/15/2019    Sig - Route: Take 3 tablets by mouth Daily. - Oral   Sent to pharmacy as: azaTHIOprine 50 MG Oral Tablet   E-Prescribing Status: Receipt confirmed by pharmacy (10/15/2019  2:49 PM EDT)   Pharmacy     [id number] - [place], [place] - [address] [place] [address] RD. AT [facility] - [phone] [facility] [phone] FX

## 2020-10-27 RX ORDER — NALOXEGOL OXALATE 25 MG/1
TABLET, FILM COATED ORAL
Qty: 30 TABLET | Refills: 11 | Status: SHIPPED | OUTPATIENT
Start: 2020-10-27 | End: 2020-11-10 | Stop reason: SDUPTHER

## 2020-10-27 RX ORDER — RABEPRAZOLE SODIUM 20 MG/1
TABLET, DELAYED RELEASE ORAL
Qty: 90 TABLET | Refills: 3 | Status: SHIPPED | OUTPATIENT
Start: 2020-10-27 | End: 2020-11-10 | Stop reason: SDUPTHER

## 2020-11-10 ENCOUNTER — OFFICE VISIT (OUTPATIENT)
Dept: GASTROENTEROLOGY | Facility: CLINIC | Age: 50
End: 2020-11-10

## 2020-11-10 VITALS — TEMPERATURE: 98.8 F | WEIGHT: 249 LBS | BODY MASS INDEX: 42.51 KG/M2 | HEIGHT: 64 IN

## 2020-11-10 DIAGNOSIS — T40.2X5A CONSTIPATION DUE TO OPIOID THERAPY: ICD-10-CM

## 2020-11-10 DIAGNOSIS — K51.30 ULCERATIVE RECTOSIGMOIDITIS WITHOUT COMPLICATION (HCC): Primary | ICD-10-CM

## 2020-11-10 DIAGNOSIS — K59.03 CONSTIPATION DUE TO OPIOID THERAPY: ICD-10-CM

## 2020-11-10 PROCEDURE — 99213 OFFICE O/P EST LOW 20 MIN: CPT | Performed by: INTERNAL MEDICINE

## 2020-11-10 RX ORDER — NALOXEGOL OXALATE 25 MG/1
25 TABLET, FILM COATED ORAL DAILY
Qty: 90 TABLET | Refills: 3 | Status: SHIPPED | OUTPATIENT
Start: 2020-11-10 | End: 2022-11-10 | Stop reason: SDUPTHER

## 2020-11-10 RX ORDER — RABEPRAZOLE SODIUM 20 MG/1
20 TABLET, DELAYED RELEASE ORAL DAILY
Qty: 90 TABLET | Refills: 3 | Status: SHIPPED | OUTPATIENT
Start: 2020-11-10 | End: 2021-11-23

## 2020-11-10 RX ORDER — BACLOFEN 10 MG/1
10 TABLET ORAL 2 TIMES DAILY
COMMUNITY
Start: 2020-11-06

## 2020-11-10 RX ORDER — AZATHIOPRINE 50 MG/1
150 TABLET ORAL DAILY
Qty: 270 TABLET | Refills: 3 | Status: SHIPPED | OUTPATIENT
Start: 2020-11-10 | End: 2021-11-23

## 2020-11-10 NOTE — PROGRESS NOTES
Chief Complaint   Patient presents with   • Follow-up   • Ulcerative Colitis       Joe Hernandez is a  50 y.o. female here for a follow up visit for ulcerative proctosigmoiditis and chronic constipation due to narcotics.    HPI     Patient 50-year-old female history of osteoarthritis, spinal cord injury, ulcerative colitis and drug-induced constipation here for follow-up.  Patient last colonoscopy approximately 10 years ago due for follow-up.  Patient denies any bright red blood per rectum or melena does have occasional crampy abdominal discomfort but of most part stools are regular regular on current therapy.  Patient has noted weight gain but is been more sedentary due to current viral outbreak.  Patient here for med refill.    Past Medical History:   Diagnosis Date   • History of CT scan of abdomen 08/04/2013    fatty liver, post gastric stapling procedure, wal thickening of colon, colitis   • Osteoarthritis    • Spinal cord injury, cervical region (CMS/HCC)    • Ulcerative colitis (CMS/HCC)          Current Outpatient Medications:   •  azaTHIOprine (IMURAN) 50 MG tablet, TAKE 3 TABLETS BY MOUTH EVERY DAY, Disp: 270 tablet, Rfl: 0  •  baclofen (LIORESAL) 10 MG tablet, Take 10 mg by mouth 2 (Two) Times a Day., Disp: , Rfl:   •  Biotin 10 MG tablet, Take  by mouth., Disp: , Rfl:   •  cyclobenzaprine (FLEXERIL) 10 MG tablet, Take  by mouth., Disp: , Rfl:   •  docusate sodium (COLACE) 100 MG capsule, Take 1 capsule by mouth 2 (Two) Times a Day., Disp: 60 capsule, Rfl: 11  •  ferrous sulfate 325 (65 FE) MG tablet, TAKE 1 TABLET EVERY DAY, Disp: , Rfl:   •  gabapentin (NEURONTIN) 800 MG tablet, Take  by mouth., Disp: , Rfl:   •  loratadine (CLARITIN) 10 MG tablet, TAKE 1 TABLET BY MOUTH DAILY AS NEEDED FOR ALLERGIES, Disp: , Rfl: 3  •  medroxyPROGESTERone (PROVERA) 10 MG tablet, , Disp: , Rfl:   •  Movantik 25 MG tablet, TAKE 1 TABLET BY MOUTH EVERY DAY, Disp: 30 tablet, Rfl: 11  •  Multiple Vitamins-Minerals  (MULTI COMPLETE PO), Take  by mouth., Disp: , Rfl:   •  OXYCONTIN 40 MG 12 hr tablet, TAKE 1 TABLET BY MOUTH EVERY 12 HOURS AS NEEDED FOR PAIN, Disp: , Rfl: 0  •  Probiotic capsule, Take  by mouth., Disp: , Rfl:   •  RABEprazole (ACIPHEX) 20 MG EC tablet, TAKE 1 TABLET BY MOUTH EVERY DAY, Disp: 90 tablet, Rfl: 3    Allergies   Allergen Reactions   • Red Dye Rash and Swelling   • Adhesive Tape Rash   • Nuts Itching and Rash   • Penicillins Rash       Social History     Socioeconomic History   • Marital status: Single     Spouse name: Not on file   • Number of children: Not on file   • Years of education: Not on file   • Highest education level: Not on file   Tobacco Use   • Smoking status: Never Smoker   • Smokeless tobacco: Never Used   Substance and Sexual Activity   • Alcohol use: No   • Drug use: No       Family History   Family history unknown: Yes       Review of Systems   Constitutional: Negative.    Respiratory: Negative.    Cardiovascular: Negative.    Gastrointestinal: Negative for abdominal distention, abdominal pain, anal bleeding, blood in stool, constipation, diarrhea, nausea and rectal pain.   Musculoskeletal: Positive for arthralgias, back pain and gait problem.   Skin: Negative.    Hematological: Negative.        Vitals:    11/10/20 1102   Temp: 98.8 °F (37.1 °C)       Physical Exam  Vitals signs reviewed.   Constitutional:       Appearance: She is well-developed.   HENT:      Head: Normocephalic and atraumatic.   Eyes:      General: No scleral icterus.     Pupils: Pupils are equal, round, and reactive to light.   Cardiovascular:      Rate and Rhythm: Normal rate and regular rhythm.      Heart sounds: Normal heart sounds.   Pulmonary:      Effort: Pulmonary effort is normal. No respiratory distress.      Breath sounds: Normal breath sounds.   Abdominal:      General: Bowel sounds are normal. There is no distension.      Palpations: Abdomen is soft. There is no mass.      Tenderness: There is no  abdominal tenderness.      Hernia: No hernia is present.   Musculoskeletal:      Right lower leg: Edema present.      Left lower leg: Edema present.      Comments: Patient in motorized wheelchair   Skin:     General: Skin is warm and dry.   Neurological:      General: No focal deficit present.      Mental Status: She is alert and oriented to person, place, and time.      Cranial Nerves: No cranial nerve deficit.   Psychiatric:         Behavior: Behavior normal.         Thought Content: Thought content normal.         Judgment: Judgment normal.         No visits with results within 2 Month(s) from this visit.   Latest known visit with results is:   No results found for any previous visit.       Diagnoses and all orders for this visit:    1. Ulcerative rectosigmoiditis without complication (CMS/[facility]) (Primary)  -     Case Request; Standing  -     Implement Anesthesia orders day of procedure.; Standing  -     Obtain informed consent; Standing  -     Case Request    2. Constipation due to opioid therapy      Patient 50-year-old female with history of chronic back pain, proctosigmoiditis and osteoarthritis here for follow-up.  Patient doing relatively well on current medication therapy.  Bowels for the most part are regular though has occasional day or 2 of loose stools.  Patient denies any bright red blood per rectum or melena no chest pain or shortness of breath.  We will continue with her current therapy, arrange follow-up colonoscopy early next year try to avoid Covid for now patient is due.  We will follow-up clinically based on response to monitor her therapy.

## 2020-12-28 ENCOUNTER — TELEPHONE (OUTPATIENT)
Dept: GASTROENTEROLOGY | Facility: CLINIC | Age: 50
End: 2020-12-28

## 2021-01-08 ENCOUNTER — TELEPHONE (OUTPATIENT)
Dept: GASTROENTEROLOGY | Facility: CLINIC | Age: 51
End: 2021-01-08

## 2021-01-08 NOTE — TELEPHONE ENCOUNTER
Spoke with pt regarding this medication.  Angela MARINO placed pt on hold called pharmacy. Pharmacy states they did not receive the Escrib order that was confirmed in our system Angela MARINO gave verbal order to pharmacist to fill for pt.  Pt notified

## 2021-01-08 NOTE — TELEPHONE ENCOUNTER
[employer] has approved a 30 day supply for the drug listed above but denied your request for a 90 day supply. The drug you requested is a specialty drug. Your Prescription Drug Guide says that specialty drugs are limited to a 30-day supply. You can view your PDG online at [employer]/SearchResources. A full list of pharmacies is also available at The Pharmacy Finder Tool at [email or web address] or you may call customer [facility] at [phone] (TTY: [phone]), 8 a.m. 8 p.m. EST, seven days a week for a full list of in-network pharmacies. If receiving your drugs via mail-order delivery is more convenient for you, you can visit our list of in-network mail-order pharmacies at [email or web address] (30-day supply limit on some specialty drugs also applies to mail-order). [employer] has approved coverage for ORTIKOS ER 9 MG CAPSULE #30 under your Part D benefit through 12.31.21.

## 2021-02-09 ENCOUNTER — TELEPHONE (OUTPATIENT)
Dept: GASTROENTEROLOGY | Facility: CLINIC | Age: 51
End: 2021-02-09

## 2021-02-09 NOTE — TELEPHONE ENCOUNTER
----- Message from Ismael Benitez sent at 2/5/2021  2:52 PM EST -----  Regarding: Medication pa  Contact: [phone]  Pt needs a prior auth for her medication. Thank you    Budesonide ER 9 MG capsule sustained-release 24 hr 30 capsule 11 1/21/2021    Sig - Route: Take 9 mg by mouth Daily. - Oral   Sent to pharmacy as: Budesonide ER 9 MG Oral Capsule Extended Release 24 Hour   E-Prescribing Status: Receipt confirmed by pharmacy (1/21/2021  9:58 AM EST)   Pharmacy    [id number] - [address] AT [facility] [phone] [facility] [phone]

## 2021-02-10 ENCOUNTER — TELEPHONE (OUTPATIENT)
Dept: GASTROENTEROLOGY | Facility: CLINIC | Age: 51
End: 2021-02-10

## 2021-02-11 NOTE — TELEPHONE ENCOUNTER
Bharti Franco [facility] [address]  Phone Number: [phone]    Patient is asking for her medication that needs a PA - budesonide .

## 2021-02-12 NOTE — TELEPHONE ENCOUNTER
Key: [id number]    Deniedon February 10  [employer] has approved a 30 day supply for the drug listed above but denied your request for a 90 day supply. The drug you requested is a specialty drug. Your Prescription Drug Guide says that specialty drugs are limited to a 30-day supply. You can view your PDG online at [employer]/SearchResources. A full list of pharmacies is also available at The Pharmacy Finder Tool at [email or web address]/pharmacy/ or you may call customer [facility] at [phone] (TTY: [phone]), 8 a.m. 8 p.m. EST, seven days a week for a full list of in-network pharmacies. If receiving your drugs via mail-order delivery is more convenient for you, you can visit our list of in-network mail-order pharmacies at [email or web address].[employer]/mail-order (30-day supply limit on some specialty drugs also applies to mail-order). [employer] has approved coverage for BUDESONIDE ER 9 MG TABLET (up to a 30 day supply) under your Part D benefit for/through 8 weeks.

## 2021-02-12 NOTE — TELEPHONE ENCOUNTER
Called pt and advised PA for budesonide is still in process. Advised pt to call customer service number per Crystal's note on 1/8/21 to see if there is a mail order pharmacy we need to see rx to.  Pt aggressively blamed office for not getting medication covered. Repeatedly explained to pt she needs to call her ins company to see where we need to send rx for mail order.

## 2021-04-02 ENCOUNTER — TELEPHONE (OUTPATIENT)
Dept: GASTROENTEROLOGY | Facility: CLINIC | Age: 51
End: 2021-04-02

## 2021-04-02 NOTE — TELEPHONE ENCOUNTER
----- Message from Ismael Waddell sent at 4/2/2021  9:53 AM EDT -----  Regarding: Colitis flare up  Contact: [phone]  PT states she is having a colitis flare up and would like to discuss having a prescription called in to help, please advise

## 2021-04-02 NOTE — TELEPHONE ENCOUNTER
She needs not to take Imodium for her cramps as she already tends to constipation. Okay to give her some Levsin sublingual 0.125 mg every 4 for cramps and arrange office visit next week

## 2021-04-02 NOTE — TELEPHONE ENCOUNTER
Returned patient's phone call. She states she has had severe abdominal cramping and pain. She states on Wednesday she was passing pasty diarrhea several times a day. She states she has not had a BM since early yesterday. She states she is having the urge to pass a BM but is unable.   She states she does note have any blood in stool.   She states she took Imodium and Mylanta yesterday to help with the pain and it did not help. States the only change she has had with her medications is her probiotic. She states the one she was taking has been discontinued and she has not found one that works.   Advised an update will be sent to Dr. Hays for advisement.   Pt verbalized understanding.

## 2021-04-08 ENCOUNTER — TELEPHONE (OUTPATIENT)
Dept: GASTROENTEROLOGY | Facility: CLINIC | Age: 51
End: 2021-04-08

## 2021-04-08 ENCOUNTER — OFFICE VISIT (OUTPATIENT)
Dept: GASTROENTEROLOGY | Facility: CLINIC | Age: 51
End: 2021-04-08

## 2021-04-08 VITALS — HEIGHT: 64 IN | BODY MASS INDEX: 42.74 KG/M2 | TEMPERATURE: 97 F

## 2021-04-08 DIAGNOSIS — K51.30 ULCERATIVE RECTOSIGMOIDITIS WITHOUT COMPLICATION (HCC): Primary | ICD-10-CM

## 2021-04-08 PROCEDURE — 99214 OFFICE O/P EST MOD 30 MIN: CPT | Performed by: INTERNAL MEDICINE

## 2021-04-08 RX ORDER — DULOXETIN HYDROCHLORIDE 30 MG/1
CAPSULE, DELAYED RELEASE ORAL
COMMUNITY
Start: 2021-04-05

## 2021-04-08 RX ORDER — MESALAMINE 1.2 G/1
4800 TABLET, DELAYED RELEASE ORAL
Qty: 120 TABLET | Refills: 5 | Status: SHIPPED | OUTPATIENT
Start: 2021-04-08 | End: 2021-09-14

## 2021-04-08 RX ORDER — GABAPENTIN 600 MG/1
TABLET ORAL 3 TIMES DAILY
COMMUNITY
Start: 2021-04-07

## 2021-04-08 NOTE — TELEPHONE ENCOUNTER
Called to [email or web address] department [phone] and spoke with Lis. She states budesonide is a specialty drug and will be covered every 30 days. It cannot be filled as any other quantity. Per her, patient can get a 30 day supply and refill it every month, not a 90 day supply at once. As long as she gets 30 tablets at a time, it will be covered for ever how many refill are requested. Call reference #[id number].

## 2021-04-08 NOTE — PROGRESS NOTES
Chief Complaint   Patient presents with   • Diarrhea   • Abdominal Cramping       Joe Hernandez is a  50 y.o. female here for a follow up visit for ulcerative proctosigmoiditis flare.    HPI     Patient 50-year-old female with history of osteoarthritis, fatty liver, ulcerative proctosigmoiditis here with exacerbation.  Patient reports rectal urgency and inability to come off the toilet with diarrhea.  Patient reports she will start to go to the bathroom particularly in the morning and in have to spend the northern amount of time they are feeling that she still has to go to the bathroom.  Patient does suffer some immobility due to her arthritis issues in her spine.  Patient with significant lymphedema as well.  Patient denies any nausea vomiting no fever chills but is having rectal pain.    Past Medical History:   Diagnosis Date   • History of CT scan of abdomen 08/04/2013    fatty liver, post gastric stapling procedure, wal thickening of colon, colitis   • Osteoarthritis    • Spinal cord injury, cervical region (CMS/HCC)    • Ulcerative colitis (CMS/HCC)          Current Outpatient Medications:   •  azaTHIOprine (IMURAN) 50 MG tablet, Take 3 tablets by mouth Daily., Disp: 270 tablet, Rfl: 3  •  baclofen (LIORESAL) 10 MG tablet, Take 10 mg by mouth 2 (Two) Times a Day., Disp: , Rfl:   •  Biotin 10 MG tablet, Take  by mouth., Disp: , Rfl:   •  Budesonide ER 9 MG capsule sustained-release 24 hr, Take 9 mg by mouth Daily., Disp: 30 capsule, Rfl: 11  •  cyclobenzaprine (FLEXERIL) 10 MG tablet, Take  by mouth., Disp: , Rfl:   •  docusate sodium (COLACE) 100 MG capsule, Take 1 capsule by mouth 2 (Two) Times a Day., Disp: 60 capsule, Rfl: 11  •  DULoxetine (CYMBALTA) 30 MG capsule, , Disp: , Rfl:   •  ferrous sulfate 325 (65 FE) MG tablet, TAKE 1 TABLET EVERY DAY, Disp: , Rfl:   •  gabapentin (NEURONTIN) 600 MG tablet, 3 (Three) Times a Day., Disp: , Rfl:   •  hyoscyamine (LEVSIN) 0.125 MG SL tablet, Take 1 tablet  by mouth Every 4 (Four) Hours As Needed for Cramping., Disp: 90 tablet, Rfl: 5  •  loratadine (CLARITIN) 10 MG tablet, TAKE 1 TABLET BY MOUTH DAILY AS NEEDED FOR ALLERGIES, Disp: , Rfl: 3  •  Multiple Vitamins-Minerals (MULTI COMPLETE PO), Take  by mouth., Disp: , Rfl:   •  Naloxegol Oxalate (Movantik) 25 MG tablet, Take 1 tablet by mouth Daily., Disp: 90 tablet, Rfl: 3  •  OXYCONTIN 40 MG 12 hr tablet, TAKE 1 TABLET BY MOUTH EVERY 12 HOURS AS NEEDED FOR PAIN, Disp: , Rfl: 0  •  Probiotic capsule, Take  by mouth., Disp: , Rfl:   •  RABEprazole (ACIPHEX) 20 MG EC tablet, Take 1 tablet by mouth Daily., Disp: 90 tablet, Rfl: 3  •  gabapentin (NEURONTIN) 800 MG tablet, Take  by mouth., Disp: , Rfl:   •  medroxyPROGESTERone (PROVERA) 10 MG tablet, , Disp: , Rfl:     Allergies   Allergen Reactions   • Red Dye Rash and Swelling   • Adhesive Tape Rash   • Benadryl [Diphenhydramine] Swelling and Rash   • Nuts Itching and Rash   • Penicillins Rash   • Pepto-Bismol [Bismuth] Swelling and Rash       Social History     Socioeconomic History   • Marital status: Single     Spouse name: Not on file   • Number of children: Not on file   • Years of education: Not on file   • Highest education level: Not on file   Tobacco Use   • Smoking status: Never Smoker   • Smokeless tobacco: Never Used   Substance and Sexual Activity   • Alcohol use: No   • Drug use: No       Family History   Family history unknown: Yes       Review of Systems   Constitutional: Negative.    Respiratory: Negative.    Cardiovascular: Negative.    Gastrointestinal: Positive for abdominal pain, diarrhea and rectal pain. Negative for abdominal distention, anal bleeding, blood in stool, constipation and vomiting.   Musculoskeletal: Positive for arthralgias, back pain and gait problem.   Skin: Negative.        Vitals:    04/08/21 1159   Temp: 97 °F (36.1 °C)       Physical Exam  Vitals reviewed.   Constitutional:       Appearance: Normal appearance. She is  well-developed.   HENT:      Head: Normocephalic and atraumatic.   Eyes:      General: No scleral icterus.     Pupils: Pupils are equal, round, and reactive to light.   Cardiovascular:      Rate and Rhythm: Normal rate and regular rhythm.      Heart sounds: Normal heart sounds.   Pulmonary:      Effort: Pulmonary effort is normal. No respiratory distress.      Breath sounds: Normal breath sounds.   Abdominal:      General: Bowel sounds are normal. There is no distension.      Palpations: Abdomen is soft. There is no mass.      Tenderness: There is no abdominal tenderness.      Hernia: No hernia is present.   Musculoskeletal:      Right lower leg: Edema present.      Left lower leg: Edema present.   Skin:     General: Skin is warm and dry.      Coloration: Skin is not jaundiced.      Findings: No rash.   Neurological:      General: No focal deficit present.      Mental Status: She is alert and oriented to person, place, and time.   Psychiatric:         Behavior: Behavior normal.         Thought Content: Thought content normal.         No visits with results within 2 Month(s) from this visit.   Latest known visit with results is:   No results found for any previous visit.       Diagnoses and all orders for this visit:    1. Ulcerative rectosigmoiditis without complication (CMS/[facility]) (Primary)  -     CBC (No Diff)  -     Iron  -     Comprehensive Metabolic Panel  -     Sedimentation Rate  -     C-reactive Protein      Patient 50-year-old female with history of ulcerative proctosigmoiditis, spinal fusion and immobility presenting with exacerbation of her colitis.  Patient reports rectal urgency with rectal pain and diarrhea.  Patient however describes diarrhea as 1 loose bowel movement today but having trouble getting off the toilet when she starts.  Patient has continued sensation of the urge to go despite passing stool.  Patient denies any significant bright red blood per rectum or melena noted.  Patient reports still  taking her usual medications.  Patient had been due for colonoscopy but still pending.  Patient missed appointment.  Will check labs today including inflammatory markers CBC iron levels.  If signs of possible infection or active inflammation will arrange CT.  We will add mesalamine pending results and consider urgent colonoscopy if things stabilize.

## 2021-04-09 LAB
ALBUMIN SERPL-MCNC: 3.8 G/DL (ref 3.5–5.2)
ALBUMIN/GLOB SERPL: 1.2 G/DL
ALP SERPL-CCNC: 87 U/L (ref 39–117)
ALT SERPL-CCNC: 12 U/L (ref 1–33)
AST SERPL-CCNC: 17 U/L (ref 1–32)
BILIRUB SERPL-MCNC: 0.6 MG/DL (ref 0–1.2)
BUN SERPL-MCNC: 11 MG/DL (ref 6–20)
BUN/CREAT SERPL: 20 (ref 7–25)
CALCIUM SERPL-MCNC: 9 MG/DL (ref 8.6–10.5)
CHLORIDE SERPL-SCNC: 105 MMOL/L (ref 98–107)
CO2 SERPL-SCNC: 29.8 MMOL/L (ref 22–29)
CREAT SERPL-MCNC: 0.55 MG/DL (ref 0.57–1)
CRP SERPL-MCNC: <0.3 MG/DL (ref 0–0.5)
ERYTHROCYTE [DISTWIDTH] IN BLOOD BY AUTOMATED COUNT: 13.9 % (ref 12.3–15.4)
ERYTHROCYTE [SEDIMENTATION RATE] IN BLOOD BY WESTERGREN METHOD: 54 MM/HR (ref 0–30)
GLOBULIN SER CALC-MCNC: 3.1 GM/DL
GLUCOSE SERPL-MCNC: 96 MG/DL (ref 65–99)
HCT VFR BLD AUTO: 37.7 % (ref 34–46.6)
HGB BLD-MCNC: 12.6 G/DL (ref 12–15.9)
IRON SERPL-MCNC: 26 MCG/DL (ref 37–145)
MCH RBC QN AUTO: 32.4 PG (ref 26.6–33)
MCHC RBC AUTO-ENTMCNC: 33.4 G/DL (ref 31.5–35.7)
MCV RBC AUTO: 96.9 FL (ref 79–97)
PLATELET # BLD AUTO: 386 10*3/MM3 (ref 140–450)
POTASSIUM SERPL-SCNC: 4.2 MMOL/L (ref 3.5–5.2)
PROT SERPL-MCNC: 6.9 G/DL (ref 6–8.5)
RBC # BLD AUTO: 3.89 10*6/MM3 (ref 3.77–5.28)
SODIUM SERPL-SCNC: 140 MMOL/L (ref 136–145)
WBC # BLD AUTO: 6.9 10*3/MM3 (ref 3.4–10.8)

## 2021-04-09 NOTE — TELEPHONE ENCOUNTER
Prescription for Budesonide 9 mg one tablet daily #30 with 11 refills called into pharmacy. Patient called and left VM advising of the above.

## 2021-04-12 ENCOUNTER — TELEPHONE (OUTPATIENT)
Dept: GASTROENTEROLOGY | Facility: CLINIC | Age: 51
End: 2021-04-12

## 2021-04-12 RX ORDER — LACTOBACIL 2/BIFIDO 1/S.THERMO 450B CELL
1 PACKET (EA) ORAL 2 TIMES DAILY WITH MEALS
Qty: 60 CAPSULE | Refills: 3 | Status: SHIPPED | OUTPATIENT
Start: 2021-04-12 | End: 2022-11-10

## 2021-04-12 NOTE — TELEPHONE ENCOUNTER
----- Message from Ismael Benitez sent at 4/12/2021 12:29 PM EDT -----  Regarding: med question  Contact: [phone]  Pt returned call. Please call pt back. Thank you

## 2021-04-12 NOTE — TELEPHONE ENCOUNTER
Returned patient's phone call. She questions if her Budesonide has been called in? Advised it was called in on Friday. She verb understanding.

## 2021-04-26 ENCOUNTER — TELEPHONE (OUTPATIENT)
Dept: GASTROENTEROLOGY | Facility: CLINIC | Age: 51
End: 2021-04-26

## 2021-04-26 DIAGNOSIS — K51.319 ULCERATIVE RECTOSIGMOIDITIS WITH COMPLICATION (HCC): Primary | ICD-10-CM

## 2021-04-26 NOTE — TELEPHONE ENCOUNTER
----- Message from Ismael Waddell sent at 4/26/2021  2:38 PM EDT -----  Regarding: Health update  Contact: [phone]  PT is calling to give a health update since last visit with angel luis, please call PT

## 2021-04-26 NOTE — TELEPHONE ENCOUNTER
"Returned patient's phone call. She states she is still cramping and is having constipation then diarrhea. She states it is taking her couple of days to recuperate.   She states she is is having \"rumbling\" in her stomach and at times she has nausea. Complaining of stomach pain.   States her diet consists of soft food.   States she is Budesonide 9 mg daily.   She questions what she can do.   States she not taking VIsbiome High Potency Probiotic. She states she is taking her own antibiotics. She is taking the Rabeprzole. States she is not taking Movantik, it causes worsening of symptoms.     Patient advised patient per Dr. Hays's note: Labs normal except elevated sed rate, please arrange CT abdomen and pelvis.    Advised will place an order for the CT scan. She verb understanding.       "

## 2021-04-30 ENCOUNTER — TELEPHONE (OUTPATIENT)
Dept: GASTROENTEROLOGY | Facility: CLINIC | Age: 51
End: 2021-04-30

## 2021-04-30 PROBLEM — K51.319 ULCERATIVE RECTOSIGMOIDITIS WITH COMPLICATION (HCC): Status: ACTIVE | Noted: 2021-04-30

## 2021-04-30 NOTE — TELEPHONE ENCOUNTER
Patient called. Advised as per Dr. Hays's note. She verb understanding.   Patient questions if there is any medication she can take to feel better? Advised will send an update to Dr. Hays.

## 2021-05-05 ENCOUNTER — TRANSCRIBE ORDERS (OUTPATIENT)
Dept: SLEEP MEDICINE | Facility: HOSPITAL | Age: 51
End: 2021-05-05

## 2021-05-05 DIAGNOSIS — Z01.818 OTHER SPECIFIED PRE-OPERATIVE EXAMINATION: Primary | ICD-10-CM

## 2021-05-06 ENCOUNTER — APPOINTMENT (OUTPATIENT)
Dept: LAB | Facility: HOSPITAL | Age: 51
End: 2021-05-06

## 2021-05-06 ENCOUNTER — TRANSCRIBE ORDERS (OUTPATIENT)
Dept: ADMINISTRATIVE | Facility: HOSPITAL | Age: 51
End: 2021-05-06

## 2021-05-06 DIAGNOSIS — Z01.818 OTHER SPECIFIED PRE-OPERATIVE EXAMINATION: Primary | ICD-10-CM

## 2021-05-07 ENCOUNTER — LAB (OUTPATIENT)
Dept: LAB | Facility: HOSPITAL | Age: 51
End: 2021-05-07

## 2021-05-07 DIAGNOSIS — Z01.818 OTHER SPECIFIED PRE-OPERATIVE EXAMINATION: ICD-10-CM

## 2021-05-07 PROCEDURE — C9803 HOPD COVID-19 SPEC COLLECT: HCPCS

## 2021-05-07 PROCEDURE — U0004 COV-19 TEST NON-CDC HGH THRU: HCPCS

## 2021-05-08 LAB — SARS-COV-2 ORF1AB RESP QL NAA+PROBE: NOT DETECTED

## 2021-05-10 ENCOUNTER — HOSPITAL ENCOUNTER (OUTPATIENT)
Facility: HOSPITAL | Age: 51
Setting detail: HOSPITAL OUTPATIENT SURGERY
Discharge: HOME | End: 2021-05-10
Attending: INTERNAL MEDICINE | Admitting: INTERNAL MEDICINE

## 2021-05-10 ENCOUNTER — ANESTHESIA (OUTPATIENT)
Dept: GASTROENTEROLOGY | Facility: HOSPITAL | Age: 51
End: 2021-05-10

## 2021-05-10 ENCOUNTER — ANESTHESIA EVENT (OUTPATIENT)
Dept: GASTROENTEROLOGY | Facility: HOSPITAL | Age: 51
End: 2021-05-10

## 2021-05-10 VITALS
RESPIRATION RATE: 16 BRPM | HEIGHT: 64 IN | DIASTOLIC BLOOD PRESSURE: 80 MMHG | SYSTOLIC BLOOD PRESSURE: 127 MMHG | WEIGHT: 240 LBS | HEART RATE: 100 BPM | BODY MASS INDEX: 40.97 KG/M2 | OXYGEN SATURATION: 100 %

## 2021-05-10 DIAGNOSIS — K51.30 ULCERATIVE RECTOSIGMOIDITIS WITHOUT COMPLICATION (HCC): ICD-10-CM

## 2021-05-10 DIAGNOSIS — K51.319 ULCERATIVE RECTOSIGMOIDITIS WITH COMPLICATION (HCC): ICD-10-CM

## 2021-05-10 PROCEDURE — 88305 TISSUE EXAM BY PATHOLOGIST: CPT | Performed by: INTERNAL MEDICINE

## 2021-05-10 PROCEDURE — 25010000002 PHENYLEPHRINE PER 1 ML: Performed by: ANESTHESIOLOGY

## 2021-05-10 PROCEDURE — 45380 COLONOSCOPY AND BIOPSY: CPT | Performed by: INTERNAL MEDICINE

## 2021-05-10 PROCEDURE — S0260 H&P FOR SURGERY: HCPCS | Performed by: INTERNAL MEDICINE

## 2021-05-10 PROCEDURE — 25010000002 PROPOFOL 10 MG/ML EMULSION: Performed by: ANESTHESIOLOGY

## 2021-05-10 RX ORDER — PROPOFOL 10 MG/ML
VIAL (ML) INTRAVENOUS AS NEEDED
Status: DISCONTINUED | OUTPATIENT
Start: 2021-05-10 | End: 2021-05-10 | Stop reason: SURG

## 2021-05-10 RX ORDER — PROPOFOL 10 MG/ML
VIAL (ML) INTRAVENOUS CONTINUOUS PRN
Status: DISCONTINUED | OUTPATIENT
Start: 2021-05-10 | End: 2021-05-10 | Stop reason: SURG

## 2021-05-10 RX ORDER — LIDOCAINE HYDROCHLORIDE 20 MG/ML
INJECTION, SOLUTION INFILTRATION; PERINEURAL AS NEEDED
Status: DISCONTINUED | OUTPATIENT
Start: 2021-05-10 | End: 2021-05-10 | Stop reason: SURG

## 2021-05-10 RX ORDER — SODIUM CHLORIDE, SODIUM LACTATE, POTASSIUM CHLORIDE, CALCIUM CHLORIDE 600; 310; 30; 20 MG/100ML; MG/100ML; MG/100ML; MG/100ML
30 INJECTION, SOLUTION INTRAVENOUS CONTINUOUS PRN
Status: DISCONTINUED | OUTPATIENT
Start: 2021-05-10 | End: 2021-05-10 | Stop reason: HOSPADM

## 2021-05-10 RX ORDER — EPHEDRINE SULFATE 50 MG/ML
INJECTION, SOLUTION INTRAVENOUS AS NEEDED
Status: DISCONTINUED | OUTPATIENT
Start: 2021-05-10 | End: 2021-05-10 | Stop reason: SURG

## 2021-05-10 RX ADMIN — PROPOFOL 300 MCG/KG/MIN: 10 INJECTION, EMULSION INTRAVENOUS at 09:57

## 2021-05-10 RX ADMIN — SODIUM CHLORIDE, POTASSIUM CHLORIDE, SODIUM LACTATE AND CALCIUM CHLORIDE 30 ML/HR: 600; 310; 30; 20 INJECTION, SOLUTION INTRAVENOUS at 09:33

## 2021-05-10 RX ADMIN — PHENYLEPHRINE HYDROCHLORIDE 100 MCG: 10 INJECTION INTRAVENOUS at 10:14

## 2021-05-10 RX ADMIN — PHENYLEPHRINE HYDROCHLORIDE 100 MCG: 10 INJECTION INTRAVENOUS at 10:03

## 2021-05-10 RX ADMIN — PROPOFOL 200 MG: 10 INJECTION, EMULSION INTRAVENOUS at 09:57

## 2021-05-10 RX ADMIN — EPHEDRINE SULFATE 10 MG: 50 INJECTION INTRAVENOUS at 10:13

## 2021-05-10 RX ADMIN — LIDOCAINE HYDROCHLORIDE 60 MG: 20 INJECTION, SOLUTION INFILTRATION; PERINEURAL at 09:57

## 2021-05-10 NOTE — BRIEF OP NOTE
COLONOSCOPY  Progress Note    Joe Hernandez  5/10/2021    Pre-op Diagnosis:   Ulcerative rectosigmoiditis with complication (CMS/HCC) [K51.319]       Post-Op Diagnosis Codes:     * Ulcerative rectosigmoiditis with complication (CMS/HCC) [K51.319]     * Internal hemorrhoids [K64.8]    Procedure/CPT® Codes:        Procedure(s):  COLONOSCOPY TO CECUM AND INTO TERMINAL ILEUM WITH COLD BIOPSIES    Surgeon(s):  Jaron Hays MD    Anesthesia: Monitored Anesthesia Care    Staff:   Endo Technician: Janet Argueta  Endo Nurse: Taylor Mendoza RN         Estimated Blood Loss: minimal    Urine Voided: * No values recorded between 5/10/2021  9:51 AM and 5/10/2021 10:17 AM *    Specimens:                Specimens     ID Source Type Tests Collected By Collected At Frozen?    A Large Intestine, Left / Descending Colon Tissue · TISSUE PATHOLOGY EXAM   Jaron Hays MD 5/10/21 1013     Description: DESCENDING COLON BIOPSIES    B Large Intestine, Sigmoid Colon Tissue · TISSUE PATHOLOGY EXAM   Jaron Hays MD 5/10/21 1013     Description: SIGMOID COLON BIOPSIES    C Large Intestine, Rectum Tissue · TISSUE PATHOLOGY EXAM   Jaron Hays MD 5/10/21 1013     Description: RECTAL BIOPSIES                Drains: * No LDAs found *    Findings: Colonoscopy to the cecum and terminal ileum with normal ileum mucosa.  Colonic mucosa was normal throughout with internal hemorrhoids.  Biopsies of the descending colon the rectum and the sigmoid were all obtained.  No obvious active colitis seen throughout.    Complications: None          Jaron Hays MD     Date: 5/10/2021  Time: 10:19 EDT

## 2021-05-10 NOTE — DISCHARGE INSTRUCTIONS
For the next 24 hours patient needs to be with a responsible adult.    For 24 hours DO NOT drive, operate machinery, appliances, drink alcohol, make important decisions or sign legal documents.    Start with a light or bland diet if you are feeling sick to your stomach otherwise advance to regular diet as tolerated.    Follow recommendations on procedure report if provided by your doctor.    Call Dr Hays for problems [phone]    Problems may include but not limited to: large amounts of bleeding, trouble breathing, repeated vomiting, severe unrelieved pain, fever or chills.

## 2021-05-10 NOTE — H&P
[facility]  Pre Procedure History & Physical    Chief Complaint:   Patient 51-year-old female with history of ulcerative proctosigmoiditis presenting with exacerbation    Subjective     HPI:   Patient 51-year-old female with history of ulcerative proctosigmoiditis, spinal cord injury, lymphedema of osteoarthritis and immobility presenting with exacerbation of chronic lower GI complaints.  Patient with diarrhea and cramping.  Patient denies any bright red blood per rectum or melena currently on extensive regimen for her ulcerative proctosigmoiditis.  Patient here to evaluate response to therapy.    Past Medical History:   Past Medical History:   Diagnosis Date   • History of CT scan of abdomen 08/04/2013    fatty liver, post gastric stapling procedure, wal thickening of colon, colitis   • Lymphedema    • Osteoarthritis    • Spinal cord injury, cervical region (CMS/HCC)    • Stomach ulcer    • Ulcerative colitis (CMS/HCC)        Past Surgical History:  Past Surgical History:   Procedure Laterality Date   • COLONOSCOPY     • ENDOSCOPY  09/2013   • SPINAL FUSION     • STOMACH SURGERY      ulcer surgery       Family History:  Family History   Family history unknown: Yes       Social History:   reports that she has never smoked. She has never used smokeless tobacco. She reports that she does not drink alcohol and does not use drugs.    Medications:   Medications Prior to Admission   Medication Sig Dispense Refill Last Dose   • azaTHIOprine (IMURAN) 50 MG tablet Take 3 tablets by mouth Daily. 270 tablet 3 5/9/2021 at Unknown time   • Budesonide ER 9 MG capsule sustained-release 24 hr Take 9 mg by mouth Daily. 30 capsule 11 5/9/2021 at Unknown time   • gabapentin (NEURONTIN) 600 MG tablet 3 (Three) Times a Day.   5/9/2021 at Unknown time   • gabapentin (NEURONTIN) 800 MG tablet Take  by mouth.   5/9/2021 at Unknown time   • hyoscyamine (LEVSIN) 0.125 MG SL tablet TAKE 1 TABLET BY MOUTH EVERY 4 HOURS  "AS NEEDED FOR CRAMPING 90 tablet 5 5/9/2021 at Unknown time   • loratadine (CLARITIN) 10 MG tablet TAKE 1 TABLET BY MOUTH DAILY AS NEEDED FOR ALLERGIES  3 5/9/2021 at Unknown time   • mesalamine (Lialda) 1.2 g EC tablet Take 4 tablets by mouth Daily With Breakfast. 120 tablet 5 5/9/2021 at Unknown time   • Multiple Vitamins-Minerals (MULTI COMPLETE PO) Take  by mouth.   5/9/2021 at Unknown time   • OXYCONTIN 40 MG 12 hr tablet TAKE 1 TABLET BY MOUTH EVERY 12 HOURS AS NEEDED FOR PAIN  0 5/9/2021 at Unknown time   • Probiotic capsule Take  by mouth.   5/9/2021 at Unknown time   • Probiotic Product (Visbiome High Potency) capsule Take 1 capsule by mouth 2 (Two) Times a Day With Meals. 60 capsule 3 5/9/2021 at Unknown time   • RABEprazole (ACIPHEX) 20 MG EC tablet Take 1 tablet by mouth Daily. 90 tablet 3 5/9/2021 at Unknown time   • baclofen (LIORESAL) 10 MG tablet Take 10 mg by mouth 2 (Two) Times a Day.   More than a month at Unknown time   • Biotin 10 MG tablet Take  by mouth.   More than a month at Unknown time   • cyclobenzaprine (FLEXERIL) 10 MG tablet Take  by mouth.   More than a month at Unknown time   • docusate sodium (COLACE) 100 MG capsule Take 1 capsule by mouth 2 (Two) Times a Day. 60 capsule 11    • DULoxetine (CYMBALTA) 30 MG capsule    Unknown at Unknown time   • ferrous sulfate 325 (65 FE) MG tablet TAKE 1 TABLET EVERY DAY   More than a month at Unknown time   • medroxyPROGESTERone (PROVERA) 10 MG tablet    Unknown at Unknown time   • Naloxegol Oxalate (Movantik) 25 MG tablet Take 1 tablet by mouth Daily. 90 tablet 3 More than a month at Unknown time       Allergies:  Red dye, Adhesive tape, Benadryl [diphenhydramine], Nuts, Penicillins, and Pepto-bismol [bismuth]    ROS:    Pertinent items are noted in HPI     Objective     Blood pressure 121/68, pulse 103, resp. rate 16, height 162.6 cm (64\"), weight 109 kg (240 lb), SpO2 100 %.    Physical Exam   Constitutional: Pt is oriented to person, place, " and time and well-developed, well-nourished, and in no distress.   Mouth/Throat: Oropharynx is clear and moist.   Neck: Normal range of motion.   Cardiovascular: Normal rate, regular rhythm and normal heart sounds.    Pulmonary/Chest: Effort normal and breath sounds normal.   Abdominal: Soft. Nontender  Skin: Skin is warm and dry.   Psychiatric: Mood, memory, affect and judgment normal.     Assessment/Plan     Diagnosis:  Ulcerative proctosigmoiditis  Diarrhea  Crampy abdominal pain    Anticipated Surgical Procedure:  Colonoscopy    The risks, benefits, and alternatives of this procedure have been discussed with the patient or the responsible party- the patient understands and agrees to proceed.

## 2021-05-10 NOTE — ANESTHESIA POSTPROCEDURE EVALUATION
Patient: Joe Hernandez    Procedure Summary     Date: 05/10/21 Room / Location:  [facility]    Anesthesia Start: 0951 Anesthesia Stop: 1022    Procedure: COLONOSCOPY TO CECUM AND INTO TERMINAL ILEUM WITH COLD BIOPSIES (N/A ) Diagnosis:       Ulcerative rectosigmoiditis with complication (CMS/HCC)      Internal hemorrhoids      (Ulcerative rectosigmoiditis with complication (CMS/HCC) [K51.319])    Surgeons: Jaron Hays MD Provider: Aaron Whelan MD    Anesthesia Type: MAC ASA Status: 3          Anesthesia Type: MAC    Vitals  No vitals data found for the desired time range.          Post Anesthesia Care and Evaluation    Patient location during evaluation: PHASE II  Patient participation: complete - patient participated  Level of consciousness: awake and alert  Pain management: adequate  Airway patency: patent  Anesthetic complications: No anesthetic complications  PONV Status: none  Cardiovascular status: acceptable and hemodynamically stable  Respiratory status: acceptable, nonlabored ventilation and spontaneous ventilation  Hydration status: acceptable

## 2021-05-10 NOTE — ANESTHESIA PREPROCEDURE EVALUATION
Anesthesia Evaluation     Patient summary reviewed and Nursing notes reviewed                Airway   Mallampati: III  TM distance: >3 FB  Neck ROM: full  Possible difficult intubation  Dental - normal exam     Pulmonary - normal exam   Cardiovascular - normal exam        Neuro/Psych    ROS Comment: Hx cervical spinal injury in 2014, s/p cervical fusion, residual weakness bilateral arms and legs  GI/Hepatic/Renal/Endo    (+) obesity, morbid obesity, PUD,  liver disease fatty liver disease,     ROS Comment: Hx of UC/hx of gastric stapling    Musculoskeletal         ROS comment: Lymphedema  Abdominal   (+) obese,    Substance History      OB/GYN          Other   arthritis,                    Anesthesia Plan    ASA 3     MAC     intravenous induction     Anesthetic plan, all risks, benefits, and alternatives have been provided, discussed and informed consent has been obtained with: patient.

## 2021-05-12 LAB
LAB AP CASE REPORT: NORMAL
PATH REPORT.FINAL DX SPEC: NORMAL
PATH REPORT.GROSS SPEC: NORMAL

## 2021-06-23 ENCOUNTER — TELEPHONE (OUTPATIENT)
Dept: GASTROENTEROLOGY | Facility: CLINIC | Age: 51
End: 2021-06-23

## 2021-06-23 NOTE — TELEPHONE ENCOUNTER
----- Message from Jaron Hays MD sent at 5/26/2021  9:18 AM EDT -----  Biopsies unremarkable, no active colitis seen.  Begin Pamelor 10 mg at bedtime as well as a high-fiber diet increase water intake.  If constipation worsens begin MiraLAX 17 g daily.

## 2021-09-03 ENCOUNTER — TELEPHONE (OUTPATIENT)
Dept: GASTROENTEROLOGY | Facility: CLINIC | Age: 51
End: 2021-09-03

## 2021-09-03 NOTE — TELEPHONE ENCOUNTER
Called pt, she states she doesn't think the mesalamine is working. She is having a colitis flare.  States she is having cramping, loose stools, occasional diarrhea and a lot of urgency. Pt is unable to leave her home d/t fear of stooling on self.  Pt is asking for prednisone.  Advised will send a msg to Dr Hays for recommendations.

## 2021-09-03 NOTE — TELEPHONE ENCOUNTER
----- Message from Ismael Day sent at 9/3/2021  9:20 AM EDT -----  Regarding: mesalamine (Lialda) 1.2 g EC tablet  Contact: [phone]  Pt says the medication is not working.

## 2021-09-13 NOTE — TELEPHONE ENCOUNTER
Called pt and advised of Dr. Cho note.  Pt verbalized understanding.     Pt does not wish to do stool samples b/c she doesn't have transportation.      Pt thinks the loose stools are food related. Advised pt to avoid greasy foods.    Pt states she is willing to try Pamelor. Pt is asking if she should continue Lialda.    Msg sent to Dr Hays.

## 2021-09-14 RX ORDER — MESALAMINE 1.2 G/1
4800 TABLET, DELAYED RELEASE ORAL
Qty: 360 TABLET | Refills: 1 | Status: SHIPPED | OUTPATIENT
Start: 2021-09-14 | End: 2022-11-10

## 2021-09-15 RX ORDER — NORTRIPTYLINE HYDROCHLORIDE 10 MG/1
10 CAPSULE ORAL NIGHTLY
Qty: 30 CAPSULE | Refills: 11 | Status: SHIPPED | OUTPATIENT
Start: 2021-09-15 | End: 2022-11-10

## 2021-09-15 NOTE — TELEPHONE ENCOUNTER
Patient called. Advised as per Dr. Hays's note. She verb understanding. She states she would like to try Nortriptyline. Advised an update will go to Dr. Hays.

## 2021-09-17 RX ORDER — NORTRIPTYLINE HYDROCHLORIDE 10 MG/1
10 CAPSULE ORAL NIGHTLY
Qty: 30 CAPSULE | Refills: 1 | Status: SHIPPED | OUTPATIENT
Start: 2021-09-17 | End: 2022-11-10

## 2021-11-23 RX ORDER — AZATHIOPRINE 50 MG/1
TABLET ORAL
Qty: 270 TABLET | Refills: 3 | Status: SHIPPED | OUTPATIENT
Start: 2021-11-23 | End: 2022-11-10 | Stop reason: SDUPTHER

## 2021-11-23 RX ORDER — RABEPRAZOLE SODIUM 20 MG/1
TABLET, DELAYED RELEASE ORAL
Qty: 90 TABLET | Refills: 3 | Status: SHIPPED | OUTPATIENT
Start: 2021-11-23 | End: 2022-10-04 | Stop reason: SDUPTHER

## 2022-02-02 ENCOUNTER — APPOINTMENT (OUTPATIENT)
Dept: CT IMAGING | Facility: HOSPITAL | Age: 52
End: 2022-02-02

## 2022-02-04 RX ORDER — BUDESONIDE 9 MG/1
TABLET, FILM COATED, EXTENDED RELEASE ORAL
Qty: 90 TABLET | Refills: 3 | Status: SHIPPED | OUTPATIENT
Start: 2022-02-04 | End: 2022-12-30 | Stop reason: SDUPTHER

## 2022-02-16 ENCOUNTER — HOSPITAL ENCOUNTER (OUTPATIENT)
Dept: CT IMAGING | Facility: HOSPITAL | Age: 52
Discharge: HOME | End: 2022-02-16
Admitting: INTERNAL MEDICINE

## 2022-02-16 DIAGNOSIS — K51.319 ULCERATIVE RECTOSIGMOIDITIS WITH COMPLICATION: ICD-10-CM

## 2022-02-16 PROCEDURE — 0 DIATRIZOATE MEGLUMINE & SODIUM PER 1 ML: Performed by: INTERNAL MEDICINE

## 2022-02-16 PROCEDURE — 74177 CT ABD & PELVIS W/CONTRAST: CPT

## 2022-02-16 RX ADMIN — DIATRIZOATE MEGLUMINE AND DIATRIZOATE SODIUM 30 ML: 660; 100 LIQUID ORAL; RECTAL at 11:00

## 2022-03-04 ENCOUNTER — TELEPHONE (OUTPATIENT)
Dept: GASTROENTEROLOGY | Facility: CLINIC | Age: 52
End: 2022-03-04

## 2022-03-04 DIAGNOSIS — K51.919 ULCERATIVE COLITIS WITH COMPLICATION, UNSPECIFIED LOCATION: ICD-10-CM

## 2022-03-04 DIAGNOSIS — K51.319 ULCERATIVE RECTOSIGMOIDITIS WITH COMPLICATION: ICD-10-CM

## 2022-03-04 DIAGNOSIS — K51.30 ULCERATIVE RECTOSIGMOIDITIS WITHOUT COMPLICATION: Primary | ICD-10-CM

## 2022-03-04 NOTE — TELEPHONE ENCOUNTER
Patient notified of results and recommendations and verbalized understanding    Order placed for CT in 6 months.

## 2022-03-04 NOTE — TELEPHONE ENCOUNTER
----- Message from Jaron Hays MD sent at 3/4/2022 11:21 AM EST -----  CT: Was unremarkable no minimal changes in the appendix were seen not consistent with appendicitis.  There was a small area in the right kidney of uncertain significance and recommended to repeat the CT in 6 months

## 2022-05-02 ENCOUNTER — TELEPHONE (OUTPATIENT)
Dept: GASTROENTEROLOGY | Facility: CLINIC | Age: 52
End: 2022-05-02

## 2022-08-18 ENCOUNTER — TELEPHONE (OUTPATIENT)
Dept: GASTROENTEROLOGY | Facility: CLINIC | Age: 52
End: 2022-08-18

## 2022-08-18 NOTE — TELEPHONE ENCOUNTER
PA approved and valid 08/18/22 through 10/13/2022. No approval letter to scan into chart. One of the MA's will scan approval letter in chart once we receive through the fax. Pharmacy notified     Joe Hernandez Key: [id number] - PA Case ID: [id number] help? Call us at [phone]  Outcome  Approvedtoday  PA Case: [id number], Status: Approved, Coverage Starts on: 8/18/2022 12:00:00 AM, Coverage Ends on: 10/13/2022 12:00:00 AM. Questions? Contact [phone].  Drug  Budesonide ER 9MG er tablets  Form  [employer] PA Form

## 2022-08-31 ENCOUNTER — APPOINTMENT (OUTPATIENT)
Dept: CT IMAGING | Facility: HOSPITAL | Age: 52
End: 2022-08-31

## 2022-09-23 ENCOUNTER — TELEPHONE (OUTPATIENT)
Dept: GASTROENTEROLOGY | Facility: CLINIC | Age: 52
End: 2022-09-23

## 2022-09-23 RX ORDER — RABEPRAZOLE SODIUM 20 MG/1
TABLET, DELAYED RELEASE ORAL
Qty: 90 TABLET | Refills: 3 | OUTPATIENT
Start: 2022-09-23

## 2022-09-23 NOTE — TELEPHONE ENCOUNTER
Caller: Joe Hernandez    Relationship: Self    Best call back number: [phone]    What is the best time to reach you: ANYTIME    Who are you requesting to speak with (clinical staff, provider,  specific staff member): CLINICAL STAFF    What was the call regarding: PT REC'D MESSAGE TO SCHEDULE APPT TO GET REFILL OF MEDS. PT IS COMPLETELY OUT. APPT HAS BEEN SCHEDULED FOR 11.10.22 @ 230P TO SEE DR ABREU. PT REQUESTING THAT SHE BE ABLE TO REFILL MEDS ASAP.     Do you require a callback: YES

## 2022-09-27 NOTE — TELEPHONE ENCOUNTER
Pt is scheduled to see Dr Hays on 11/10/22, pt would like a refill until seen for rabeprazole 20mg

## 2022-10-04 ENCOUNTER — TELEPHONE (OUTPATIENT)
Dept: GASTROENTEROLOGY | Facility: CLINIC | Age: 52
End: 2022-10-04

## 2022-10-04 RX ORDER — RABEPRAZOLE SODIUM 20 MG/1
20 TABLET, DELAYED RELEASE ORAL DAILY
Qty: 90 TABLET | Refills: 0 | Status: SHIPPED | OUTPATIENT
Start: 2022-10-04 | End: 2022-11-10 | Stop reason: SDUPTHER

## 2022-10-04 NOTE — TELEPHONE ENCOUNTER
Caller: Joe Hernandez    Relationship: Self    Best call back number: [phone]    What is the best time to reach you: ANY    Who are you requesting to speak with (clinical staff, provider,  specific staff member): ABREU    Do you know the name of the person who called:     What was the call regarding: PT IS COMPLETELY OUT rabeprazole 20mg APPT 11/9 CAN THIS BE REFILLED UNTIL APPT    Do you require a callback: YES

## 2022-10-26 ENCOUNTER — APPOINTMENT (OUTPATIENT)
Dept: CT IMAGING | Facility: HOSPITAL | Age: 52
End: 2022-10-26

## 2022-11-10 ENCOUNTER — OFFICE VISIT (OUTPATIENT)
Dept: GASTROENTEROLOGY | Facility: CLINIC | Age: 52
End: 2022-11-10

## 2022-11-10 VITALS — BODY MASS INDEX: 38.07 KG/M2 | HEIGHT: 64 IN | TEMPERATURE: 96 F | WEIGHT: 223 LBS

## 2022-11-10 DIAGNOSIS — K51.30 ULCERATIVE RECTOSIGMOIDITIS WITHOUT COMPLICATION: Primary | ICD-10-CM

## 2022-11-10 DIAGNOSIS — K59.03 CONSTIPATION DUE TO OPIOID THERAPY: ICD-10-CM

## 2022-11-10 DIAGNOSIS — T40.2X5A CONSTIPATION DUE TO OPIOID THERAPY: ICD-10-CM

## 2022-11-10 PROCEDURE — 99213 OFFICE O/P EST LOW 20 MIN: CPT | Performed by: INTERNAL MEDICINE

## 2022-11-10 RX ORDER — RABEPRAZOLE SODIUM 20 MG/1
20 TABLET, DELAYED RELEASE ORAL DAILY
Qty: 90 TABLET | Refills: 3 | Status: SHIPPED | OUTPATIENT
Start: 2022-11-10

## 2022-11-10 RX ORDER — NALOXEGOL OXALATE 25 MG/1
25 TABLET, FILM COATED ORAL AS NEEDED
Qty: 90 TABLET | Refills: 3 | Status: SHIPPED | OUTPATIENT
Start: 2022-11-10

## 2022-11-10 RX ORDER — AZATHIOPRINE 50 MG/1
150 TABLET ORAL DAILY
Qty: 270 TABLET | Refills: 3 | Status: SHIPPED | OUTPATIENT
Start: 2022-11-10

## 2022-11-10 RX ORDER — DULOXETIN HYDROCHLORIDE 30 MG/1
30 CAPSULE, DELAYED RELEASE ORAL DAILY
COMMUNITY
Start: 2022-10-19

## 2022-11-10 NOTE — PROGRESS NOTES
Chief Complaint   Patient presents with   • Ulcerative Colitis   • Heartburn       Joe Hernandez is a  52 y.o. female here for a follow up visit for history of proctosigmoiditis and GERD for med refill.    HPI     Patient 52-year-old female with history of lymphedema, osteoarthritis, spinal cord injury with history of ulcerative proctosigmoiditis here for follow-up.  Patient clinically doing well on current therapy though having some constipation due to not taking her regular medications.  Patient currently at rehab facility after hospitalization now feeling improved.    Past Medical History:   Diagnosis Date   • Anemia    • History of CT scan of abdomen 08/04/2013    fatty liver, post gastric stapling procedure, wal thickening of colon, colitis   • Lymphedema    • Osteoarthritis    • Spinal cord injury, cervical region (HCC)    • Stomach ulcer    • Ulcerative colitis (HCC)          Current Outpatient Medications:   •  azaTHIOprine (IMURAN) 50 MG tablet, Take 3 tablets by mouth Daily., Disp: 270 tablet, Rfl: 3  •  baclofen (LIORESAL) 10 MG tablet, Take 10 mg by mouth 2 (Two) Times a Day., Disp: , Rfl:   •  Biotin 10 MG tablet, Take  by mouth., Disp: , Rfl:   •  Budesonide ER 9 MG tablet sustained-release 24 hour, TAKE 1 TABLET BY MOUTH EVERY DAY, Disp: 90 tablet, Rfl: 3  •  docusate sodium (COLACE) 100 MG capsule, Take 1 capsule by mouth 2 (Two) Times a Day. (Patient taking differently: Take 1 capsule by mouth Daily.), Disp: 60 capsule, Rfl: 11  •  DULoxetine (CYMBALTA) 30 MG capsule, Take 1 capsule by mouth Daily., Disp: , Rfl:   •  ferrous sulfate 325 (65 FE) MG tablet, TAKE 1 TABLET EVERY DAY, Disp: , Rfl:   •  gabapentin (NEURONTIN) 600 MG tablet, 3 (Three) Times a Day., Disp: , Rfl:   •  hyoscyamine (LEVSIN) 0.125 MG SL tablet, Place 1 tablet under the tongue Every 4 (Four) Hours As Needed for Cramping., Disp: 360 tablet, Rfl: 3  •  loratadine (CLARITIN) 10 MG tablet, TAKE 1 TABLET BY MOUTH DAILY AS  NEEDED FOR ALLERGIES, Disp: , Rfl: 3  •  Multiple Vitamins-Minerals (MULTI COMPLETE PO), Take  by mouth., Disp: , Rfl:   •  Naloxegol Oxalate (Movantik) 25 MG tablet, Take 1 tablet by mouth As Needed (Constipation)., Disp: 90 tablet, Rfl: 3  •  OXYCONTIN 40 MG 12 hr tablet, TAKE 1 TABLET BY MOUTH EVERY 12 HOURS AS NEEDED FOR PAIN, Disp: , Rfl: 0  •  Polyethylene Glycol 3350 (MIRALAX PO), Take  by mouth As Needed., Disp: , Rfl:   •  Probiotic capsule, Take  by mouth., Disp: , Rfl:   •  RABEprazole (ACIPHEX) 20 MG EC tablet, Take 1 tablet by mouth Daily., Disp: 90 tablet, Rfl: 3  •  cyclobenzaprine (FLEXERIL) 10 MG tablet, Take  by mouth., Disp: , Rfl:   •  DULoxetine (CYMBALTA) 30 MG capsule, , Disp: , Rfl:   •  gabapentin (NEURONTIN) 800 MG tablet, Take  by mouth., Disp: , Rfl:   •  medroxyPROGESTERone (PROVERA) 10 MG tablet, , Disp: , Rfl:     Allergies   Allergen Reactions   • Red Dye Rash and Swelling   • Adhesive Tape Rash   • Benadryl [Diphenhydramine] Swelling and Rash   • Latex Rash   • Nuts Itching and Rash   • Penicillins Rash   • Pepto-Bismol [Bismuth] Swelling and Rash       Social History     Socioeconomic History   • Marital status: Single   Tobacco Use   • Smoking status: Never   • Smokeless tobacco: Never   Vaping Use   • Vaping Use: Never used   Substance and Sexual Activity   • Alcohol use: No   • Drug use: Never       Family History   Family history unknown: Yes       Review of Systems   Constitutional: Negative.    Respiratory: Negative.    Cardiovascular: Negative.    Gastrointestinal: Negative.    Skin: Negative.    Hematological: Negative.        Vitals:    11/10/22 1440   Temp: 96 °F (35.6 °C)       Physical Exam  Vitals reviewed.   Constitutional:       Appearance: Normal appearance. She is well-developed.   HENT:      Head: Normocephalic and atraumatic.   Eyes:      General: No scleral icterus.     Pupils: Pupils are equal, round, and reactive to light.   Pulmonary:      Effort: Pulmonary  effort is normal. No respiratory distress.      Breath sounds: Normal breath sounds.   Abdominal:      General: Bowel sounds are normal. There is no distension.      Palpations: Abdomen is soft. There is no mass.      Tenderness: There is no abdominal tenderness.      Hernia: No hernia is present.   Skin:     General: Skin is warm and dry.      Coloration: Skin is not jaundiced.      Findings: No rash.   Neurological:      General: No focal deficit present.      Mental Status: She is alert and oriented to person, place, and time.      Cranial Nerves: No cranial nerve deficit.   Psychiatric:         Behavior: Behavior normal.         Thought Content: Thought content normal.         No visits with results within 2 Month(s) from this visit.   Latest known visit with results is:   Admission on 05/10/2021, Discharged on 05/10/2021   Component Date Value Ref Range Status   • Case Report 05/10/2021    Final                    Value:Surgical Pathology Report                         Case: [id number]                                  Authorizing Provider:  Jaron Hays MD    Collected:           05/10/2021 10:13 AM          Ordering Location:     [facility]  Received:            05/10/2021 11:14 AM                                 ENDO SUITES                                                                  Pathologist:           Josephine Sparrow MD                                                    Specimens:   1) - Large Intestine, Left / Descending Colon, DESCENDING COLON BIOPSIES                            2) - Large Intestine, Sigmoid Colon, SIGMOID COLON BIOPSIES                                         3) - Large Intestine, Rectum, RECTAL BIOPSIES                                             • Final Diagnosis 05/10/2021    Final                    Value:This result contains rich text formatting which cannot be displayed here.   • Gross Description 05/10/2021    Final                     Value:This result contains rich text formatting which cannot be displayed here.       Diagnoses and all orders for this visit:    1. Ulcerative rectosigmoiditis without complication (HCC) (Primary)    2. Constipation due to opioid therapy    Other orders  -     RABEprazole (ACIPHEX) 20 MG EC tablet; Take 1 tablet by mouth Daily.  Dispense: 90 tablet; Refill: 3  -     azaTHIOprine (IMURAN) 50 MG tablet; Take 3 tablets by mouth Daily.  Dispense: 270 tablet; Refill: 3  -     hyoscyamine (LEVSIN) 0.125 MG SL tablet; Place 1 tablet under the tongue Every 4 (Four) Hours As Needed for Cramping.  Dispense: 360 tablet; Refill: 3  -     Naloxegol Oxalate (Movantik) 25 MG tablet; Take 1 tablet by mouth As Needed (Constipation).  Dispense: 90 tablet; Refill: 3      Patient 52-year-old female with history of ulcerative proctosigmoiditis, GERD, lymphedema history gastric ulcer here for follow-up.  Patient needs med refill currently in physical therapy after hospitalization.  Patient GI tract doing well with actually some constipation due to inactivity at the physical therapy facility.  We will respond resume medications as directed and follow-up clinically.

## 2022-12-30 ENCOUNTER — TELEPHONE (OUTPATIENT)
Dept: GASTROENTEROLOGY | Facility: CLINIC | Age: 52
End: 2022-12-30

## 2022-12-30 RX ORDER — BUDESONIDE 9 MG/1
1 TABLET, FILM COATED, EXTENDED RELEASE ORAL DAILY
Qty: 90 TABLET | Refills: 3 | Status: SHIPPED | OUTPATIENT
Start: 2022-12-30 | End: 2022-12-30

## 2022-12-30 NOTE — TELEPHONE ENCOUNTER
Caller: Joe Hernandez    Relationship: Self    Best call back number: [phone]    When is it needed: ASAP    Additional information : PATIENT CALLED IN STATING SHE IS OUT OF HER MEDICATION AND THE PHARMACY CLOSES EARLY TODAY SHE STATES THE MEDICATION HAS BEEN SENT TO HER PHARMACY BUT THEY ARE NEEDING PRIOR AUTHORIZATION IN ORDER TO FILL - SHE IS OUT OF MEDS AND NEEDS THIS AUTH ASAP -     Budesonide ER 9 MG tablet sustained-release 24 hour [[id number]] (Order [id number])

## 2022-12-30 NOTE — TELEPHONE ENCOUNTER
Per Dr. Hays:     When last seen in November was not on it her list.  On Imuran, complaining of constipation hold for now

## 2023-01-06 ENCOUNTER — TELEPHONE (OUTPATIENT)
Dept: GASTROENTEROLOGY | Facility: CLINIC | Age: 53
End: 2023-01-06
Payer: MEDICARE

## 2023-01-06 ENCOUNTER — TELEPHONE (OUTPATIENT)
Dept: GASTROENTEROLOGY | Facility: CLINIC | Age: 53
End: 2023-01-06

## 2023-01-06 NOTE — TELEPHONE ENCOUNTER
PA for Budesonide ER 9MG er tablets submitted through [facility] and pending.   Key: [id number]

## 2023-01-06 NOTE — TELEPHONE ENCOUNTER
Caller: Joe Hernandez    Relationship to patient: Self    Best call back number:[phone]    Patient is needing: PATIENT CALLED IN AGAIN (X4) WANTING TO CHECK ON HER AUTH FOR A MEDICATION SHE IS TRYING TO REFILL ( budesonide ) I SEE IN HER CHART THIS MEDICATION WAS DISCONTINUED ON 2/4/22. AND THAT THIS MEDICATION IS NO LONGER ON HER MED LIST SHE IS URGENTLY WANTING A CALL BACK TO GET THIS MEDICATION FILLED BEFORE THE WEEKEND AS SHE STATES SHE HAS BEEN OUT OF THIS MED FOR WEEKS. PLEASE CALL PATIENT AND ADVISE.

## 2023-01-16 ENCOUNTER — TELEPHONE (OUTPATIENT)
Dept: GASTROENTEROLOGY | Facility: CLINIC | Age: 53
End: 2023-01-16
Payer: MEDICARE

## 2023-01-16 NOTE — TELEPHONE ENCOUNTER
Caller: Joe Hernandez    Relationship to patient: Self    Best call back number: [phone]    Patient is needing: PATIENT CALLED BECAUSE SHE HADN'T HEARD BACK FROM ANYONE ABOUT HER ISSUES WITH DIARRHEA. I WAS UNABLE TO WARM TRANSFER/ NO ANSWER. I DID INFORM PATIENT THAT MESSAGE HAD BEEN SENT TO DR. ABREU AND SHE INFORMED ME THAT SHE IS IN A LOT OF PAIN AND NEEDS URGENT HELP. THIS ISSUE HAS BEEN PERSISTENT FOR 2 WEEKS AND IS GETTING WORSE FOR PATIENT.

## 2023-01-16 NOTE — TELEPHONE ENCOUNTER
----- Message from Ismael Freire sent at 1/13/2023  9:43 AM EST -----  Regarding: Severe Pain  Contact: [phone]  Caller: Joe Hernandez     Relationship to patient: Self     Best call back number:[phone]    Patient is Experiencing: Food Going Straight Through Her ( Constantly in the bathroom) and can not hold anything down, Stomach Cramping, Very Weak, Dehydrated.   Patient stated these symptoms have start after the discontinuation of the prescription Budesonide which she has been taking for 10 years.     Patient Request For A Call Back ASAP.

## 2023-01-18 RX ORDER — BUDESONIDE 3 MG/1
9 CAPSULE, COATED PELLETS ORAL DAILY
Qty: 90 CAPSULE | Refills: 3 | Status: SHIPPED | OUTPATIENT
Start: 2023-01-18 | End: 2023-04-18

## 2023-01-18 NOTE — TELEPHONE ENCOUNTER
Returned patient's phone call. She is requesting a refill for Budesonide 9 mg daily. She states she has been out of it for a month and began with diarrhea two weeks ago.   She states she is taking her Azathioprine 150 mg daily, Hyoscamine when she needs it. Rabeprazole daily, probiotics daily.     Also is taking Gabapentin, Vitamin B3, Oxycontin and Iron tablet daily.     She reports Budesonide does cause some constipation but she treats it with OTC medication. Advised will send an update to Dr. Hays. She verb understanding.

## 2023-01-26 NOTE — TELEPHONE ENCOUNTER
Per CMM:  Approvedon January 6  PA Case: [id number], Status: Approved, Coverage Starts on: 1/6/2023 12:00:00 AM, Coverage Ends on: 3/3/2023 12:00:00 AM. Questions? Contact [phone].

## 2023-04-11 ENCOUNTER — TELEPHONE (OUTPATIENT)
Dept: GASTROENTEROLOGY | Facility: CLINIC | Age: 53
End: 2023-04-11
Payer: MEDICARE

## 2023-04-13 NOTE — TELEPHONE ENCOUNTER
"Prior authorization has been approved. \"Coverage Starts on: 1/1/2023 12:00:00 AM, Coverage Ends on: 12/31/2023 12:00:00 AM.\" pharmacy has been notified.   "

## 2023-11-13 ENCOUNTER — TELEPHONE (OUTPATIENT)
Dept: GASTROENTEROLOGY | Facility: CLINIC | Age: 53
End: 2023-11-13
Payer: MEDICARE

## 2023-11-13 RX ORDER — RABEPRAZOLE SODIUM 20 MG/1
20 TABLET, DELAYED RELEASE ORAL DAILY
Qty: 30 TABLET | Refills: 0 | Status: SHIPPED | OUTPATIENT
Start: 2023-11-13

## 2023-11-13 RX ORDER — AZATHIOPRINE 50 MG/1
150 TABLET ORAL DAILY
Qty: 90 TABLET | Refills: 0 | Status: SHIPPED | OUTPATIENT
Start: 2023-11-13

## 2023-12-12 RX ORDER — RABEPRAZOLE SODIUM 20 MG/1
20 TABLET, DELAYED RELEASE ORAL DAILY
Qty: 30 TABLET | Refills: 0 | OUTPATIENT
Start: 2023-12-12

## 2023-12-12 RX ORDER — AZATHIOPRINE 50 MG/1
150 TABLET ORAL DAILY
Qty: 90 TABLET | Refills: 0 | OUTPATIENT
Start: 2023-12-12

## 2024-01-05 RX ORDER — BUDESONIDE 3 MG/1
9 CAPSULE, COATED PELLETS ORAL DAILY
Qty: 270 CAPSULE | Refills: 1 | Status: SHIPPED | OUTPATIENT
Start: 2024-01-05

## 2024-01-05 RX ORDER — RABEPRAZOLE SODIUM 20 MG/1
20 TABLET, DELAYED RELEASE ORAL DAILY
Qty: 30 TABLET | Refills: 0 | Status: SHIPPED | OUTPATIENT
Start: 2024-01-05

## 2024-01-05 RX ORDER — AZATHIOPRINE 50 MG/1
150 TABLET ORAL DAILY
Qty: 90 TABLET | Refills: 0 | Status: SHIPPED | OUTPATIENT
Start: 2024-01-05

## 2024-02-08 RX ORDER — RABEPRAZOLE SODIUM 20 MG/1
20 TABLET, DELAYED RELEASE ORAL DAILY
Qty: 30 TABLET | Refills: 0 | OUTPATIENT
Start: 2024-02-08

## 2024-02-13 RX ORDER — AZATHIOPRINE 50 MG/1
150 TABLET ORAL DAILY
Qty: 90 TABLET | Refills: 0 | Status: SHIPPED | OUTPATIENT
Start: 2024-02-13

## 2024-03-15 ENCOUNTER — TELEPHONE (OUTPATIENT)
Dept: GASTROENTEROLOGY | Facility: CLINIC | Age: 54
End: 2024-03-15
Payer: MEDICARE

## 2024-03-15 RX ORDER — AZATHIOPRINE 50 MG/1
150 TABLET ORAL DAILY
Qty: 90 TABLET | Refills: 0 | Status: SHIPPED | OUTPATIENT
Start: 2024-03-15

## 2024-03-15 NOTE — TELEPHONE ENCOUNTER
Patient called. Scheduled follow up with Sol on 4/12/24. Azathioprine script written and awaiting provider signature.

## 2024-04-16 ENCOUNTER — OFFICE VISIT (OUTPATIENT)
Dept: WOUND CARE | Facility: HOSPITAL | Age: 54
End: 2024-04-16
Payer: MEDICARE

## 2024-04-22 RX ORDER — AZATHIOPRINE 50 MG/1
150 TABLET ORAL DAILY
Qty: 90 TABLET | Refills: 1 | Status: SHIPPED | OUTPATIENT
Start: 2024-04-22

## 2024-05-03 ENCOUNTER — OFFICE VISIT (OUTPATIENT)
Dept: GASTROENTEROLOGY | Facility: CLINIC | Age: 54
End: 2024-05-03
Payer: MEDICARE

## 2024-05-03 VITALS
BODY MASS INDEX: 34.83 KG/M2 | HEART RATE: 98 BPM | TEMPERATURE: 97.8 F | WEIGHT: 204 LBS | DIASTOLIC BLOOD PRESSURE: 86 MMHG | HEIGHT: 64 IN | SYSTOLIC BLOOD PRESSURE: 124 MMHG

## 2024-05-03 DIAGNOSIS — K21.9 GASTROESOPHAGEAL REFLUX DISEASE, UNSPECIFIED WHETHER ESOPHAGITIS PRESENT: ICD-10-CM

## 2024-05-03 DIAGNOSIS — K51.30 ULCERATIVE RECTOSIGMOIDITIS WITHOUT COMPLICATION: Primary | ICD-10-CM

## 2024-05-03 PROCEDURE — 1160F RVW MEDS BY RX/DR IN RCRD: CPT | Performed by: NURSE PRACTITIONER

## 2024-05-03 PROCEDURE — 1159F MED LIST DOCD IN RCRD: CPT | Performed by: NURSE PRACTITIONER

## 2024-05-03 PROCEDURE — 99213 OFFICE O/P EST LOW 20 MIN: CPT | Performed by: NURSE PRACTITIONER

## 2024-05-03 NOTE — PROGRESS NOTES
Chief Complaint   Patient presents with    Ulcerative rectosigmoiditis without complication       HPI    Joe Hernandez is a  53 y.o. female here for a follow up visit for ulcerative proctosigmoiditis and GERD.    This patient follows with Dr. Hays, new to me.    History of lymphedema, gastric sleeve, gastric ulcer, osteoarthritis, spinal cord injury, fatty liver along with proctosigmoiditis.    On visit today she reports feeling quite well.  She takes Aciphex 20 mg once daily to manage GERD.  Denies breakthrough dyspeptic symptoms, nausea, vomiting, dysphagia or odynophagia.  Appetite is good.  Weight is stable.    Regarding ulcerative proctosigmoiditis she is currently on 1 tablet a day of budesonide.  She has been unable to taper off of budesonide due to symptoms of diarrhea.  She takes Imuran 150 mg p.o./day as well.  She finds this combination of medications controls inflammatory bowel disease symptoms.  Denies rectal bleeding or rectal pain.  She has not required Movantik as of late.    Last colonoscopy was 2021 (reviewed) - Normal ileum, diverticulosis, nonbleeding internal hemorrhoids.  Pathology was benign.    Past Medical History:   Diagnosis Date    Anemia     History of CT scan of abdomen 08/04/2013    fatty liver, post gastric stapling procedure, wal thickening of colon, colitis    Lymphedema     Osteoarthritis     Spinal cord injury, cervical region     Stomach ulcer     Ulcerative colitis        Past Surgical History:   Procedure Laterality Date    BARIATRIC SURGERY  2013    CHOLECYSTECTOMY  2014    COLONOSCOPY      COLONOSCOPY N/A 05/10/2021    Procedure: COLONOSCOPY TO CECUM AND INTO TERMINAL ILEUM WITH COLD BIOPSIES;  Surgeon: Jaron Hays MD;  Location: [facility] ENDOSCOPY;  Service: Gastroenterology;  Laterality: N/A;  PRE: HX ULCERATIVE PROCTOSIGMOIDITIS, ABDOMINAL PAIN, DIARRHEA  POST: HEMORRHOIDS    ENDOSCOPY  09/2013    HERNIA REPAIR  2/13    SPINAL FUSION      STOMACH SURGERY       ulcer surgery    UPPER GASTROINTESTINAL ENDOSCOPY         Scheduled Meds:     Continuous Infusions: No current facility-administered medications for this visit.      PRN Meds:     Allergies   Allergen Reactions    Red Dye Rash and Swelling    Adhesive Tape Rash    Benadryl [Diphenhydramine] Swelling and Rash    Bismuth Subsalicylate Rash and Swelling    Latex Rash    Nuts Itching and Rash    Penicillins Rash    Pepto-Bismol [Bismuth] Swelling and Rash       Social History     Socioeconomic History    Marital status: Single   Tobacco Use    Smoking status: Never    Smokeless tobacco: Never   Vaping Use    Vaping status: Never Used   Substance and Sexual Activity    Alcohol use: No    Drug use: Never       Family History   Family history unknown: Yes       Review of Systems   HENT:  Negative for trouble swallowing.    Gastrointestinal: Negative.        Vitals:    05/03/24 1311   BP: 124/86   Pulse: 98   Temp: 97.8 °F (36.6 °C)       Physical Exam  Constitutional:       Appearance: She is well-developed.   Abdominal:      General: Bowel sounds are normal. There is no distension.      Palpations: Abdomen is soft. There is no mass.      Tenderness: There is no abdominal tenderness. There is no guarding.      Hernia: No hernia is present.   Skin:     General: Skin is warm and dry.      Capillary Refill: Capillary refill takes less than 2 seconds.   Neurological:      Mental Status: She is alert and oriented to person, place, and time.   Psychiatric:         Behavior: Behavior normal.     Assessment    Diagnoses and all orders for this visit:    1. Ulcerative rectosigmoiditis without complication (Primary)  -     CBC (No Diff)  -     Comprehensive Metabolic Panel  -     Thiopurine Metabolites  -     C-reactive Protein    2. Gastroesophageal reflux disease, unspecified whether esophagitis present       Plan    Stable GERD continue daily PPI therapy  Follow an antireflux diet    Stable ulcerative rectosigmoiditis continue  Imuran.  We discussed tapering off of budesonide but patient is hesitant as she feels more comfortable with her current regimen of 1 tablet a day.  She was agreeable to lab work today as above to include CBC, CMP, CRP and Imuran metabolites.    Follow-up 6 months or sooner if needed.         FADUMO Blanton  [facility]  [address] [place] [address]  Office: [phone]

## 2024-05-04 LAB
ALBUMIN SERPL-MCNC: 4.1 G/DL (ref 3.8–4.9)
ALBUMIN/GLOB SERPL: 1.6 {RATIO} (ref 1.2–2.2)
ALP SERPL-CCNC: 93 IU/L (ref 44–121)
ALT SERPL-CCNC: 11 IU/L (ref 0–32)
AST SERPL-CCNC: 16 IU/L (ref 0–40)
BILIRUB SERPL-MCNC: 0.7 MG/DL (ref 0–1.2)
BUN SERPL-MCNC: 14 MG/DL (ref 6–24)
BUN/CREAT SERPL: 33 (ref 9–23)
CALCIUM SERPL-MCNC: 9.1 MG/DL (ref 8.7–10.2)
CHLORIDE SERPL-SCNC: 103 MMOL/L (ref 96–106)
CO2 SERPL-SCNC: 23 MMOL/L (ref 20–29)
CREAT SERPL-MCNC: 0.43 MG/DL (ref 0.57–1)
CRP SERPL-MCNC: 2 MG/L (ref 0–10)
EGFRCR SERPLBLD CKD-EPI 2021: 116 ML/MIN/1.73
ERYTHROCYTE [DISTWIDTH] IN BLOOD BY AUTOMATED COUNT: 12.5 % (ref 11.7–15.4)
GLOBULIN SER CALC-MCNC: 2.6 G/DL (ref 1.5–4.5)
GLUCOSE SERPL-MCNC: 87 MG/DL (ref 70–99)
HCT VFR BLD AUTO: 39.3 % (ref 34–46.6)
HGB BLD-MCNC: 13.2 G/DL (ref 11.1–15.9)
MCH RBC QN AUTO: 35.6 PG (ref 26.6–33)
MCHC RBC AUTO-ENTMCNC: 33.6 G/DL (ref 31.5–35.7)
MCV RBC AUTO: 106 FL (ref 79–97)
PLATELET # BLD AUTO: 369 X10E3/UL (ref 150–450)
POTASSIUM SERPL-SCNC: 4.5 MMOL/L (ref 3.5–5.2)
PROT SERPL-MCNC: 6.7 G/DL (ref 6–8.5)
RBC # BLD AUTO: 3.71 X10E6/UL (ref 3.77–5.28)
SODIUM SERPL-SCNC: 140 MMOL/L (ref 134–144)
WBC # BLD AUTO: 8.3 X10E3/UL (ref 3.4–10.8)

## 2024-05-08 LAB
6-TGN ENTSUB RBC: 399 PMOL/8X 10E8
6MMP ENTSUB RBC: <209 PMOL/8X 10E8

## 2024-05-09 ENCOUNTER — TELEPHONE (OUTPATIENT)
Dept: GASTROENTEROLOGY | Facility: CLINIC | Age: 54
End: 2024-05-09
Payer: MEDICARE

## 2024-05-14 ENCOUNTER — OFFICE VISIT (OUTPATIENT)
Dept: WOUND CARE | Facility: HOSPITAL | Age: 54
End: 2024-05-14
Payer: MEDICARE

## 2024-07-02 RX ORDER — AZATHIOPRINE 50 MG/1
150 TABLET ORAL DAILY
Qty: 90 TABLET | Refills: 3 | Status: SHIPPED | OUTPATIENT
Start: 2024-07-02

## 2024-11-05 ENCOUNTER — OFFICE VISIT (OUTPATIENT)
Dept: GASTROENTEROLOGY | Facility: CLINIC | Age: 54
End: 2024-11-05
Payer: MEDICARE

## 2024-11-05 VITALS
TEMPERATURE: 97.9 F | OXYGEN SATURATION: 92 % | WEIGHT: 204.2 LBS | BODY MASS INDEX: 34.86 KG/M2 | HEIGHT: 64 IN | SYSTOLIC BLOOD PRESSURE: 130 MMHG | HEART RATE: 111 BPM | DIASTOLIC BLOOD PRESSURE: 84 MMHG

## 2024-11-05 DIAGNOSIS — K21.9 GASTROESOPHAGEAL REFLUX DISEASE, UNSPECIFIED WHETHER ESOPHAGITIS PRESENT: ICD-10-CM

## 2024-11-05 DIAGNOSIS — K51.30 ULCERATIVE RECTOSIGMOIDITIS WITHOUT COMPLICATION: Primary | ICD-10-CM

## 2024-11-05 PROCEDURE — 99214 OFFICE O/P EST MOD 30 MIN: CPT | Performed by: NURSE PRACTITIONER

## 2024-11-05 PROCEDURE — 1159F MED LIST DOCD IN RCRD: CPT | Performed by: NURSE PRACTITIONER

## 2024-11-05 PROCEDURE — 1160F RVW MEDS BY RX/DR IN RCRD: CPT | Performed by: NURSE PRACTITIONER

## 2024-11-05 RX ORDER — BUDESONIDE 3 MG/1
3 CAPSULE, COATED PELLETS ORAL DAILY
Qty: 90 CAPSULE | Refills: 1 | Status: SHIPPED | OUTPATIENT
Start: 2024-11-05 | End: 2024-11-08 | Stop reason: SDUPTHER

## 2024-11-05 RX ORDER — RABEPRAZOLE SODIUM 20 MG/1
20 TABLET, DELAYED RELEASE ORAL DAILY
Qty: 90 TABLET | Refills: 3 | Status: SHIPPED | OUTPATIENT
Start: 2024-11-05

## 2024-11-05 NOTE — PROGRESS NOTES
Chief Complaint   Patient presents with    Inflammatory Bowel Disease       HPI    Joe Hernandez is a  54 y.o. female here for a follow up visit for ulcerative proctosigmoiditis and GERD.     This is a former patient of Dr. Hays that is known to me.     History of lymphedema, gastric sleeve, gastric ulcer, osteoarthritis, spinal cord injury, fatty liver along with proctosigmoiditis.    On visit today Joe reports she took herself off of Imuran in May after she became concerned that could be contributing to delayed wound healing.  She is currently taking 1 tablet a day of budesonide.  She has been on budesonide for 10 years.  She voices hesitation about trying newer generation medications to treat inflammatory bowel disease.  She still takes Kaopectate and Imodium when needed.  She uses Levsin as needed for abdominal cramps.  She takes scheduled Colace twice daily.  Stool frequency waxes and wanes but for the most part she been having 2 formed stools a day on average without rectal bleeding or rectal pain.    No upper GI complaints.  She reports adequate control of dyspeptic symptoms on Aciphex 20 mg taken once daily.  Needs a refill.    Cscope 2021 (reviewed) - Normal ileum, diverticulosis, nonbleeding internal hemorrhoids.  Pathology was benign.     Past Medical History:   Diagnosis Date    Anemia     History of CT scan of abdomen 08/04/2013    fatty liver, post gastric stapling procedure, wal thickening of colon, colitis    Lymphedema     Osteoarthritis     Spinal cord injury, cervical region     Stomach ulcer     Ulcerative colitis        Past Surgical History:   Procedure Laterality Date    BARIATRIC SURGERY  2013    CHOLECYSTECTOMY  2014    COLONOSCOPY      COLONOSCOPY N/A 05/10/2021    Procedure: COLONOSCOPY TO CECUM AND INTO TERMINAL ILEUM WITH COLD BIOPSIES;  Surgeon: Jaron Hays MD;  Location: [facility] ENDOSCOPY;  Service: Gastroenterology;  Laterality: N/A;  PRE: HX ULCERATIVE  PROCTOSIGMOIDITIS, ABDOMINAL PAIN, DIARRHEA  POST: HEMORRHOIDS    ENDOSCOPY  09/2013    HERNIA REPAIR  2/13    SPINAL FUSION      STOMACH SURGERY      ulcer surgery    UPPER GASTROINTESTINAL ENDOSCOPY         Scheduled Meds:     Continuous Infusions: No current facility-administered medications for this visit.      PRN Meds:     Allergies   Allergen Reactions    Red Dye #40 (Allura Red) Rash and Swelling    Adhesive Tape Rash    Benadryl [Diphenhydramine] Swelling and Rash    Bismuth Subsalicylate Rash and Swelling    Latex Rash    Nuts Itching and Rash    Penicillins Rash    Pepto-Bismol [Bismuth] Swelling and Rash       Social History     Socioeconomic History    Marital status: Single   Tobacco Use    Smoking status: Never    Smokeless tobacco: Never   Vaping Use    Vaping status: Never Used   Substance and Sexual Activity    Alcohol use: No    Drug use: Never    Sexual activity: Not Currently     Partners: Male     Birth control/protection: None       Family History   Family history unknown: Yes       Review of Systems   HENT:  Negative for trouble swallowing.    Gastrointestinal: Negative.        Vitals:    11/05/24 1336   BP: 130/84   Pulse: 111   Temp: 97.9 °F (36.6 °C)   SpO2: 92%       Physical Exam  Constitutional:       Appearance: She is well-developed.   Abdominal:      General: Bowel sounds are normal. There is no distension.      Palpations: Abdomen is soft. There is no mass.      Tenderness: There is no abdominal tenderness. There is no guarding.      Hernia: No hernia is present.   Skin:     General: Skin is warm and dry.      Capillary Refill: Capillary refill takes less than 2 seconds.   Neurological:      Mental Status: She is alert and oriented to person, place, and time.   Psychiatric:         Behavior: Behavior normal.     Assessment    Diagnoses and all orders for this visit:    1. Ulcerative rectosigmoiditis without complication (Primary)  -     Ambulatory Referral to Gastroenterology    2.  Gastroesophageal reflux disease, unspecified whether esophagitis present    Other orders  -     RABEprazole (ACIPHEX) 20 MG EC tablet; Take 1 tablet by mouth Daily.  Dispense: 90 tablet; Refill: 3  -     hyoscyamine (LEVSIN) 0.125 MG SL tablet; Place 1 tablet under the tongue 3 (Three) Times a Day As Needed for Cramping or Diarrhea.  Dispense: 120 tablet; Refill: 3  -     Budesonide (ENTOCORT EC) 3 MG 24 hr capsule; Take 1 capsule by mouth Daily.  Dispense: 90 capsule; Refill: 1    Plan    At the moment patient seems stable with regards to inflammatory bowel disease but she does admit she took herself off of Imuran in May due to concerns that it could be delaying wound healing and is hesitant to resume medication.  She is also hesitant to come off of budesonide which she has been on for almost 10 years.  We talked about newer generation medication to treat inflammatory bowel disease.  We also talked about consultation with IBD specialist Dr. Hernández to get his opinion on treatment options and she is agreeable.  Referral placed as such.  In the meanwhile continue Aciphex, as needed Levsin and 1 tablet a day of budesonide.         FADUMO Blanton  [facility]  [address] [place] [address]  Office: [phone]    I spent 30 minutes caring for Joe on this date of service. This time includes time spent by me in the following activities: preparing for the visit, reviewing tests, obtaining and/or reviewing a separately obtained history, performing a medically appropriate examination and/or evaluation , counseling and educating the patient/family/caregiver, ordering medications, tests, or procedures, documenting information in the medical record, independently interpreting results and communicating that information with the patient/family/caregiver and care coordination.

## 2024-11-07 ENCOUNTER — TELEPHONE (OUTPATIENT)
Dept: GASTROENTEROLOGY | Facility: CLINIC | Age: 54
End: 2024-11-07
Payer: MEDICARE

## 2024-11-07 NOTE — TELEPHONE ENCOUNTER
Provider: KARLOS GARCIA    Caller: FLYNNMANASA BETTS    Relationship to Patient: SELF    Pharmacy: [facility]/pharmacy #[id number] - [place], [place] - [facility] [phone] [facility] [phone]      Phone Number: [phone]     Reason for Call: ISSUE WITH PRESCRIPTION BEING FILLED    MS. BETTS CALLED TO CHECK THE STATUS OF HER PRESCRIPTION. PLEASE REVIEW AND ADVISE.    OK TO CALL BACK ANYTIME.

## 2024-11-08 ENCOUNTER — TELEPHONE (OUTPATIENT)
Dept: GASTROENTEROLOGY | Facility: CLINIC | Age: 54
End: 2024-11-08
Payer: MEDICARE

## 2024-11-08 DIAGNOSIS — K51.30 ULCERATIVE RECTOSIGMOIDITIS WITHOUT COMPLICATION: Primary | ICD-10-CM

## 2024-11-08 RX ORDER — BUDESONIDE 3 MG/1
3 CAPSULE, COATED PELLETS ORAL DAILY
Qty: 90 CAPSULE | Refills: 3 | Status: SHIPPED | OUTPATIENT
Start: 2024-11-08

## 2024-11-08 NOTE — TELEPHONE ENCOUNTER
No the Rabeprazole does not need a PA, its her Budesonide that was having a issue, but Sol resubmitted it so hopefully everything is fine now.

## 2024-11-08 NOTE — TELEPHONE ENCOUNTER
I spoke with patient earlier this morning and she said she was having issues with her steroid medication called in by Sol. I called the pharmacy and they said they needed a diagnosis code added to the prescription.  I went and spoke with Neelima and she resubmitted the prescription.  I called Ms Hernandez and told her to call her pharmacy in about 30 minutes and see if it was completed. PVU

## 2024-11-11 ENCOUNTER — TELEPHONE (OUTPATIENT)
Dept: GASTROENTEROLOGY | Facility: CLINIC | Age: 54
End: 2024-11-11
Payer: MEDICARE

## 2024-11-11 NOTE — TELEPHONE ENCOUNTER
Caller: Joe Hernandez    Relationship to patient: Self    Best call back number: [phone]     Patient is needing: PT ADVISED BY PHARMACY THAT OVERRIDE/PA REQUIRED FOR INSURANCE COVERAGE OF RX BUDESONIDE. PLEASE PROVIDE PA, CALL TO CONFIRM W PT WHEN COMPLETED.

## 2024-11-12 NOTE — TELEPHONE ENCOUNTER
Caller: Joe Hernandez    Relationship to patient: Self    Best call back number: [phone]    Patient is needing: PT CALLING FOR AN UPDATE ON THE PA, SHE'D LIKE A CALL BACK TODAY. PLEASE ADVISE.

## 2025-01-28 NOTE — PROGRESS NOTES
No chief complaint on file.            Patient is a former patient of [facility], last seen by FADUMO Breaux on 11/5/24 for history of ulcerative proctosigmoiditis, and GERD. Patient also is a former patient of Dr. Hays.   Other medical history includes lymphedema, gastric sleeve, gastric ulcer, osteoarthritis, fatty liver, and spinal cord injury    Patient has been on Imuran and was concerned about possible drug may affect her wound healing and has taken herself off this.  Patient has been on budesonide for almost 10 years.  She was sent to our practice to consult for IBD treatment options.    Budesonide (ENTOCORT EC) 3 MG 24 hr capsule (11/08/2024)   RABEprazole (ACIPHEX) 20 MG EC tablet (11/05/2024)   hyoscyamine (LEVSIN) 0.125 MG SL tablet (11/05/2024)   azaTHIOprine (IMURAN) 50 MG tablet (07/02/2024) self discontinued  History of Present Illness  The patient presents for evaluation of ulcerative colitis.    She has been managing her ulcerative colitis under the care of Dr. Hays for approximately 20 years, with a treatment regimen that has been largely effective. However, she has recently experienced mobility issues, which have complicated her ability to manage her scoliosis. She was previously on a combination of azathioprine, budesonide, and rabeprazole, along with a probiotic. She discontinued the azathioprine due to concerns about its potential impact on wound healing. She reports that certain foods exacerbate her symptoms, leading to severe diarrhea. She was hospitalized for a week due to this issue, during which time she was treated with Imodium. This treatment resulted in a 5-day period of constipation, followed by a bowel movement. She continues to experience difficulty with eating, often feeling as though food is not digesting properly. She experiences severe cramping, which triggers an urgent need to defecate, often resulting in prolonged periods on the toilet. She reports no presence of blood in her  stool. She believes that the increased dose of budesonide has been beneficial in reducing the frequency and urgency of her symptoms. She has not undergone a colonoscopy since the onset of her symptoms. She has not been prescribed WelChol or colestipol. She has been using Imodium to manage her symptoms, but it has not been effective. She has been taking hyoscyamine for cramping, but only as needed. She has not tried taking it proactively. She has been using Imodium to manage her symptoms, but it has not been effective. She has been taking hyoscyamine for cramping, but only as needed. She has not tried taking it proactively. Her current medications include gabapentin, budesonide (9 mg), and rabeprazole. She was initially prescribed 3 pills of budesonide, but this was reduced to 1. However, during her recent hospital stay, the dosage was increased back to 3 due to gastrointestinal issues. She underwent a cholecystectomy in 2013, which was followed by a period of illness. She also had a gastric sleeve procedure in 2013, which led to complications and a prolonged hospital stay.    MEDICATIONS  Current: gabapentin, budesonide, rabeprazole, hyoscyamine, Imodium  Discontinued: azathioprine    Results Reviewed:  5/3/24  Thiopurine metabolite normal  CRP normal  CMP (normal LFTs, ALK)  Hgb 13.2    COLONOSCOPY (05/10/2021 09:50)   Normal ileum, diverticulosis, nonbleeding ternal hemorrhoids   Pathology: benign.    CT Abdomen Pelvis With Contrast (02/16/2022 12:37)     Past Medical History:   Diagnosis Date    Anemia     History of CT scan of abdomen 08/04/2013    fatty liver, post gastric stapling procedure, wal thickening of colon, colitis    Lymphedema     Osteoarthritis     Spinal cord injury, cervical region     Stomach ulcer     Ulcerative colitis      Past Surgical History:   Procedure Laterality Date    BARIATRIC SURGERY  2013    CHOLECYSTECTOMY  2014    COLONOSCOPY      COLONOSCOPY N/A 05/10/2021    Procedure:  "COLONOSCOPY TO CECUM AND INTO TERMINAL ILEUM WITH COLD BIOPSIES;  Surgeon: Jaron Hays MD;  Location: [facility] ENDOSCOPY;  Service: Gastroenterology;  Laterality: N/A;  PRE: HX ULCERATIVE PROCTOSIGMOIDITIS, ABDOMINAL PAIN, DIARRHEA  POST: HEMORRHOIDS    ENDOSCOPY  09/2013    HERNIA REPAIR  2/13    SPINAL FUSION      STOMACH SURGERY      ulcer surgery    UPPER GASTROINTESTINAL ENDOSCOPY       Social History     Socioeconomic History    Marital status: Single   Tobacco Use    Smoking status: Never    Smokeless tobacco: Never   Vaping Use    Vaping status: Never Used   Substance and Sexual Activity    Alcohol use: No    Drug use: Never    Sexual activity: Not Currently     Partners: Male     Birth control/protection: None       Review of Systems     Vital Signs:   /70   Pulse 97   Temp 96.7 °F (35.9 °C)   Ht 162.6 cm (64.02\")   Wt 92.5 kg (204 lb)   SpO2 98%   BMI 35.00 kg/m²     Body mass index is 35 kg/m².    Physical Exam  Vitals reviewed.   Constitutional:       Appearance: Normal appearance.      Comments: Wheelchair bound   HENT:      Head:      Comments: Hard of hearing/legally deaf       Nose: No nasal deformity.   Eyes:      General: No scleral icterus.  Neck:      Comments: Trachea midline.  Cardiovascular:      Rate and Rhythm: Normal rate and regular rhythm.   Pulmonary:      Effort: No respiratory distress.      Breath sounds: Normal breath sounds.   Abdominal:      General: Bowel sounds are normal. There is no distension.      Palpations: Abdomen is soft. There is no mass.      Tenderness: There is no abdominal tenderness.   Lymphadenopathy:      Comments: No periumbilical lymphadenopathy.   Skin:     General: Skin is warm.   Neurological:      Mental Status: She is alert.       Physical Exam            Results         Assessment and Plan    Diagnoses and all orders for this visit:    1. Ulcerative rectosigmoiditis without complication (Primary)    2. Gastroesophageal reflux disease, " unspecified whether esophagitis present     3. Budesonide use  4. Long term Imuran use, now discontinued  Assessment & Plan  1. Ulcerative colitis.  A colonoscopy is optimal to assess the current state of her ulcerative colitis. She has not undergone a colonoscopy since the onset of her symptoms. She has not been prescribed WelChol or colestipol. She has been using Imodium to manage her symptoms, but it has not been effective. She has been taking hyoscyamine for cramping, but only as needed. She has not tried taking it proactively. She will continue her current regimen of budesonide 9 mg, which has been increased to 9 mg to help manage her symptoms. A low dose of colestipol will be introduced to address her diarrhea and urgency. She is advised to continue taking probiotics. Imodium can be used if colestipol does not sufficiently control her symptoms. The logistics of scheduling a colonoscopy will be arranged, potentially involving admission to the observation unit at [facility] for bowel prep. If the initial dose of colestipol proves ineffective, she should contact us for a potential increase in dosage.    PROCEDURE  The patient underwent a cholecystectomy in 2013, which was followed by a period of illness. She also had a gastric sleeve procedure in 2013, which led to complications and a prolonged hospital stay.          Follow Up   Return for after planned testing, at procedures.    Patient or patient representative verbalized consent for the use of Ambient Listening during the visit with  Nolan Hernández MD for chart documentation. 1/30/2025  13:27 EST    Patient Instructions   1.  Please continue your budesonide 9 mg daily for the time being.  Our plan will be to decrease this when we are able to  2.  Continue to hold azathioprine  3.  Continue probiotic  4.  Start colestipol 1 tablet twice daily and call us in a week or 2 to let us know if it is working or if we need to increase the dose.  You may use  Imodium during this time but wait to see how you respond to the colestipol first  5.  We will work on scheduling an EGD and colonoscopy with the assistance of the observation unit at [facility].  If they are able to help us by admitting you the night before for assistance with bowel prep.

## 2025-01-30 ENCOUNTER — OFFICE VISIT (OUTPATIENT)
Dept: GASTROENTEROLOGY | Facility: CLINIC | Age: 55
End: 2025-01-30
Payer: MEDICARE

## 2025-01-30 VITALS
HEART RATE: 97 BPM | HEIGHT: 64 IN | TEMPERATURE: 96.7 F | OXYGEN SATURATION: 98 % | SYSTOLIC BLOOD PRESSURE: 110 MMHG | WEIGHT: 204 LBS | DIASTOLIC BLOOD PRESSURE: 70 MMHG | BODY MASS INDEX: 34.83 KG/M2

## 2025-01-30 DIAGNOSIS — T40.2X5A CONSTIPATION DUE TO OPIOID THERAPY: Primary | ICD-10-CM

## 2025-01-30 DIAGNOSIS — K51.30 ULCERATIVE RECTOSIGMOIDITIS WITHOUT COMPLICATION: ICD-10-CM

## 2025-01-30 DIAGNOSIS — K21.9 GASTROESOPHAGEAL REFLUX DISEASE, UNSPECIFIED WHETHER ESOPHAGITIS PRESENT: ICD-10-CM

## 2025-01-30 DIAGNOSIS — K51.30 ULCERATIVE RECTOSIGMOIDITIS WITHOUT COMPLICATION: Primary | ICD-10-CM

## 2025-01-30 DIAGNOSIS — K59.03 CONSTIPATION DUE TO OPIOID THERAPY: Primary | ICD-10-CM

## 2025-01-30 RX ORDER — BUDESONIDE 9 MG/1
9 TABLET, FILM COATED, EXTENDED RELEASE ORAL DAILY
Qty: 90 TABLET | Refills: 1 | Status: SHIPPED | OUTPATIENT
Start: 2025-01-30

## 2025-01-30 RX ORDER — COLESTIPOL HYDROCHLORIDE 1 G/1
1 TABLET ORAL 2 TIMES DAILY
Qty: 60 TABLET | Refills: 5 | Status: SHIPPED | OUTPATIENT
Start: 2025-01-30

## 2025-01-30 NOTE — PATIENT INSTRUCTIONS
1.  Please continue your budesonide 9 mg daily for the time being.  Our plan will be to decrease this when we are able to  2.  Continue to hold azathioprine  3.  Continue probiotic  4.  Start colestipol 1 tablet twice daily and call us in a week or 2 to let us know if it is working or if we need to increase the dose.  You may use Imodium during this time but wait to see how you respond to the colestipol first  5.  We will work on scheduling an EGD and colonoscopy with the assistance of the observation unit at [facility] [place].  If they are able to help us by admitting you the night before for assistance with bowel prep.

## 2025-02-13 DIAGNOSIS — K59.03 CONSTIPATION DUE TO OPIOID THERAPY: ICD-10-CM

## 2025-02-13 DIAGNOSIS — T40.2X5A CONSTIPATION DUE TO OPIOID THERAPY: ICD-10-CM

## 2025-02-13 DIAGNOSIS — K51.30 ULCERATIVE RECTOSIGMOIDITIS WITHOUT COMPLICATION: ICD-10-CM

## 2025-02-13 RX ORDER — BUDESONIDE 9 MG/1
9 TABLET, FILM COATED, EXTENDED RELEASE ORAL DAILY
Qty: 90 TABLET | Refills: 1 | Status: CANCELLED | OUTPATIENT
Start: 2025-02-13

## 2025-02-17 ENCOUNTER — TELEPHONE (OUTPATIENT)
Dept: GASTROENTEROLOGY | Facility: CLINIC | Age: 55
End: 2025-02-17

## 2025-02-17 DIAGNOSIS — K51.30 ULCERATIVE RECTOSIGMOIDITIS WITHOUT COMPLICATION: ICD-10-CM

## 2025-02-17 DIAGNOSIS — K59.03 CONSTIPATION DUE TO OPIOID THERAPY: ICD-10-CM

## 2025-02-17 DIAGNOSIS — T40.2X5A CONSTIPATION DUE TO OPIOID THERAPY: ICD-10-CM

## 2025-02-17 NOTE — TELEPHONE ENCOUNTER
Caller: Joe Hernandez    Relationship: Self    Best call back number: [phone]      Requested Prescriptions:   BUDESONIDE 9 MG TABLET       Pharmacy where request should be sent:  [facility]     Last office visit with prescribing clinician: 1/30/2025       Additional details provided by patient: PT NEEDS ANOTHER REFILL OF MEDICATION    Does the patient have less than a 3 day supply:  [x] Yes  [] No    Would you like a call back once the refill request has been completed: [x] Yes [] No        Ismael Day Rep   02/17/25 16:31 EST

## 2025-02-18 ENCOUNTER — PREP FOR SURGERY (OUTPATIENT)
Dept: SURGERY | Facility: SURGERY CENTER | Age: 55
End: 2025-02-18
Payer: MEDICARE

## 2025-02-18 ENCOUNTER — TELEPHONE (OUTPATIENT)
Dept: GASTROENTEROLOGY | Facility: CLINIC | Age: 55
End: 2025-02-18
Payer: MEDICARE

## 2025-02-18 DIAGNOSIS — K59.03 CONSTIPATION DUE TO OPIOID THERAPY: ICD-10-CM

## 2025-02-18 DIAGNOSIS — T40.2X5A CONSTIPATION DUE TO OPIOID THERAPY: ICD-10-CM

## 2025-02-18 DIAGNOSIS — K51.30 ULCERATIVE RECTOSIGMOIDITIS WITHOUT COMPLICATION: Primary | ICD-10-CM

## 2025-02-18 RX ORDER — SODIUM CHLORIDE 0.9 % (FLUSH) 0.9 %
3 SYRINGE (ML) INJECTION EVERY 12 HOURS SCHEDULED
OUTPATIENT
Start: 2025-02-18

## 2025-02-18 RX ORDER — BUDESONIDE 9 MG/1
9 TABLET, FILM COATED, EXTENDED RELEASE ORAL DAILY
Qty: 90 TABLET | Refills: 1 | Status: SHIPPED | OUTPATIENT
Start: 2025-02-18

## 2025-02-18 RX ORDER — SODIUM CHLORIDE, SODIUM LACTATE, POTASSIUM CHLORIDE, CALCIUM CHLORIDE 600; 310; 30; 20 MG/100ML; MG/100ML; MG/100ML; MG/100ML
30 INJECTION, SOLUTION INTRAVENOUS CONTINUOUS PRN
OUTPATIENT
Start: 2025-02-18 | End: 2025-02-18

## 2025-02-18 RX ORDER — SODIUM CHLORIDE 0.9 % (FLUSH) 0.9 %
10 SYRINGE (ML) INJECTION AS NEEDED
OUTPATIENT
Start: 2025-02-18

## 2025-02-18 NOTE — TELEPHONE ENCOUNTER
We need to coordinate this patient for a colonoscopy with ROXANNET at the hospital. We need to admit her the day prior. Can you schedule her at the hospital and let me know when she's scheduled. Thanks!

## 2025-06-26 DIAGNOSIS — K59.03 CONSTIPATION DUE TO OPIOID THERAPY: ICD-10-CM

## 2025-06-26 DIAGNOSIS — K51.30 ULCERATIVE RECTOSIGMOIDITIS WITHOUT COMPLICATION: ICD-10-CM

## 2025-06-26 DIAGNOSIS — T40.2X5A CONSTIPATION DUE TO OPIOID THERAPY: ICD-10-CM

## 2025-06-26 RX ORDER — BUDESONIDE 9 MG/1
9 TABLET, FILM COATED, EXTENDED RELEASE ORAL DAILY
Qty: 90 TABLET | Refills: 1 | Status: SHIPPED | OUTPATIENT
Start: 2025-06-26

## 2025-07-26 DIAGNOSIS — K59.03 CONSTIPATION DUE TO OPIOID THERAPY: ICD-10-CM

## 2025-07-26 DIAGNOSIS — K51.30 ULCERATIVE RECTOSIGMOIDITIS WITHOUT COMPLICATION: ICD-10-CM

## 2025-07-26 DIAGNOSIS — T40.2X5A CONSTIPATION DUE TO OPIOID THERAPY: ICD-10-CM

## 2025-07-28 RX ORDER — COLESTIPOL HYDROCHLORIDE 1 G/1
1 TABLET ORAL 2 TIMES DAILY
Qty: 180 TABLET | Refills: 1 | Status: SHIPPED | OUTPATIENT
Start: 2025-07-28 | End: 2025-07-29 | Stop reason: SDUPTHER

## 2025-07-29 DIAGNOSIS — K59.03 CONSTIPATION DUE TO OPIOID THERAPY: ICD-10-CM

## 2025-07-29 DIAGNOSIS — T40.2X5A CONSTIPATION DUE TO OPIOID THERAPY: ICD-10-CM

## 2025-07-29 DIAGNOSIS — K51.30 ULCERATIVE RECTOSIGMOIDITIS WITHOUT COMPLICATION: ICD-10-CM

## 2025-07-29 RX ORDER — BUDESONIDE 9 MG/1
9 TABLET, FILM COATED, EXTENDED RELEASE ORAL DAILY
Qty: 90 TABLET | Refills: 1 | Status: SHIPPED | OUTPATIENT
Start: 2025-07-29

## 2025-07-29 RX ORDER — COLESTIPOL HYDROCHLORIDE 1 G/1
1 TABLET ORAL 2 TIMES DAILY
Qty: 180 TABLET | Refills: 1 | Status: SHIPPED | OUTPATIENT
Start: 2025-07-29

## 2025-07-29 RX ORDER — HYOSCYAMINE SULFATE 0.12 MG/1
0.12 TABLET SUBLINGUAL 3 TIMES DAILY PRN
Qty: 120 TABLET | Refills: 3 | Status: SHIPPED | OUTPATIENT
Start: 2025-07-29

## 2025-07-29 NOTE — TELEPHONE ENCOUNTER
Caller: Joe Hernandez    Relationship: Self    Best call back number: [phone]      Requested Prescriptions:   Requested Prescriptions     Pending Prescriptions Disp Refills    hyoscyamine (LEVSIN) 0.125 MG SL tablet 120 tablet 3     Sig: Place 1 tablet under the tongue 3 (Three) Times a Day As Needed for Cramping or Diarrhea.    Budesonide ER 9 MG tablet sustained-release 24 hour 90 tablet 1     Sig: Take 9 mg by mouth Daily.    colestipol (COLESTID) 1 g tablet 180 tablet 1     Sig: Take 1 tablet by mouth 2 (Two) Times a Day.        Pharmacy where request should be sent:  PT WOULD LIKE THE MEDICATIONS TO GO TO THIS PHARMACY:  [place] [id number]        Additional details provided by patient: CALL PT IF NEEDED        Ismael Day Rep   07/29/25 08:52 EDT

## 2025-08-18 DIAGNOSIS — K59.03 CONSTIPATION DUE TO OPIOID THERAPY: ICD-10-CM

## 2025-08-18 DIAGNOSIS — K51.30 ULCERATIVE RECTOSIGMOIDITIS WITHOUT COMPLICATION: ICD-10-CM

## 2025-08-18 DIAGNOSIS — T40.2X5A CONSTIPATION DUE TO OPIOID THERAPY: ICD-10-CM

## 2025-08-19 RX ORDER — COLESTIPOL HYDROCHLORIDE 1 G/1
1 TABLET ORAL 2 TIMES DAILY
Qty: 180 TABLET | Refills: 1 | Status: SHIPPED | OUTPATIENT
Start: 2025-08-19

## 2025-08-19 RX ORDER — BUDESONIDE 9 MG/1
9 TABLET, FILM COATED, EXTENDED RELEASE ORAL DAILY
Qty: 90 TABLET | Refills: 1 | Status: SHIPPED | OUTPATIENT
Start: 2025-08-19

## 2025-08-19 RX ORDER — RABEPRAZOLE SODIUM 20 MG/1
20 TABLET, DELAYED RELEASE ORAL DAILY
Qty: 90 TABLET | Refills: 3 | Status: SHIPPED | OUTPATIENT
Start: 2025-08-19

## 2025-08-19 RX ORDER — HYOSCYAMINE SULFATE 0.12 MG/1
0.12 TABLET SUBLINGUAL 3 TIMES DAILY PRN
Qty: 120 TABLET | Refills: 3 | Status: SHIPPED | OUTPATIENT
Start: 2025-08-19

## (undated) DEVICE — KT ORCA ORCAPOD DISP STRL

## (undated) DEVICE — SINGLE-USE BIOPSY FORCEPS: Brand: RADIAL JAW 4

## (undated) DEVICE — MSK ENDO PORT O2 POM ELITE CURAPLEX A/

## (undated) DEVICE — TUBING, SUCTION, 1/4" X 10', STRAIGHT: Brand: MEDLINE

## (undated) DEVICE — LN SMPL CO2 SHTRM SD STREAM W/M LUER

## (undated) DEVICE — ADAPT CLN BIOGUARD AIR/H2O DISP

## (undated) DEVICE — SENSR O2 OXIMAX FNGR A/ 18IN NONSTR

## (undated) DEVICE — THE TORRENT IRRIGATION SCOPE CONNECTOR IS USED WITH THE TORRENT IRRIGATION TUBING TO PROVIDE IRRIGATION FLUIDS SUCH AS STERILE WATER DURING GASTROINTESTINAL ENDOSCOPIC PROCEDURES WHEN USED IN CONJUNCTION WITH AN IRRIGATION PUMP (OR ELECTROSURGICAL UNIT).: Brand: TORRENT